# Patient Record
Sex: MALE | Race: WHITE | URBAN - METROPOLITAN AREA
[De-identification: names, ages, dates, MRNs, and addresses within clinical notes are randomized per-mention and may not be internally consistent; named-entity substitution may affect disease eponyms.]

---

## 2017-02-07 PROBLEM — Z00.00 ENCOUNTER FOR PREVENTIVE HEALTH EXAMINATION: Status: ACTIVE | Noted: 2017-02-07

## 2017-02-08 ENCOUNTER — OUTPATIENT (OUTPATIENT)
Dept: OUTPATIENT SERVICES | Facility: HOSPITAL | Age: 47
LOS: 1 days | End: 2017-02-08

## 2017-02-08 VITALS
DIASTOLIC BLOOD PRESSURE: 82 MMHG | SYSTOLIC BLOOD PRESSURE: 124 MMHG | HEART RATE: 80 BPM | RESPIRATION RATE: 16 BRPM | WEIGHT: 171.08 LBS | HEIGHT: 63 IN | TEMPERATURE: 99 F

## 2017-02-08 DIAGNOSIS — K43.9 VENTRAL HERNIA WITHOUT OBSTRUCTION OR GANGRENE: ICD-10-CM

## 2017-02-08 DIAGNOSIS — J34.2 DEVIATED NASAL SEPTUM: Chronic | ICD-10-CM

## 2017-02-08 LAB
HCT VFR BLD CALC: 46.3 % — SIGNIFICANT CHANGE UP (ref 39–50)
HGB BLD-MCNC: 15.5 G/DL — SIGNIFICANT CHANGE UP (ref 13–17)
MCHC RBC-ENTMCNC: 31.6 PG — SIGNIFICANT CHANGE UP (ref 27–34)
MCHC RBC-ENTMCNC: 33.5 % — SIGNIFICANT CHANGE UP (ref 32–36)
MCV RBC AUTO: 94.5 FL — SIGNIFICANT CHANGE UP (ref 80–100)
PLATELET # BLD AUTO: 283 K/UL — SIGNIFICANT CHANGE UP (ref 150–400)
PMV BLD: 11.5 FL — SIGNIFICANT CHANGE UP (ref 7–13)
RBC # BLD: 4.9 M/UL — SIGNIFICANT CHANGE UP (ref 4.2–5.8)
RBC # FLD: 13.4 % — SIGNIFICANT CHANGE UP (ref 10.3–14.5)
WBC # BLD: 9.06 K/UL — SIGNIFICANT CHANGE UP (ref 3.8–10.5)
WBC # FLD AUTO: 9.06 K/UL — SIGNIFICANT CHANGE UP (ref 3.8–10.5)

## 2017-02-08 NOTE — H&P PST ADULT - PROBLEM SELECTOR PLAN 1
scheduled ventral hernia repair on 2/16/17.   preop instructions, gi prophylaxis & surgical soap given  cbc on admit

## 2017-02-08 NOTE — H&P PST ADULT - NSANTHOSAYNRD_GEN_A_CORE
No. JODI screening performed.  STOP BANG Legend: 0-2 = LOW Risk; 3-4 = INTERMEDIATE Risk; 5-8 = HIGH Risk

## 2017-02-16 ENCOUNTER — OUTPATIENT (OUTPATIENT)
Dept: OUTPATIENT SERVICES | Facility: HOSPITAL | Age: 47
LOS: 1 days | Discharge: HOME | End: 2017-02-16

## 2017-02-16 ENCOUNTER — TRANSCRIPTION ENCOUNTER (OUTPATIENT)
Age: 47
End: 2017-02-16

## 2017-02-16 VITALS
OXYGEN SATURATION: 96 % | SYSTOLIC BLOOD PRESSURE: 110 MMHG | TEMPERATURE: 98 F | HEART RATE: 63 BPM | DIASTOLIC BLOOD PRESSURE: 77 MMHG

## 2017-02-16 VITALS
TEMPERATURE: 97 F | RESPIRATION RATE: 16 BRPM | WEIGHT: 171.08 LBS | DIASTOLIC BLOOD PRESSURE: 89 MMHG | HEIGHT: 63 IN | OXYGEN SATURATION: 97 % | HEART RATE: 90 BPM | SYSTOLIC BLOOD PRESSURE: 142 MMHG

## 2017-02-16 DIAGNOSIS — K43.9 VENTRAL HERNIA WITHOUT OBSTRUCTION OR GANGRENE: ICD-10-CM

## 2017-02-16 DIAGNOSIS — J34.2 DEVIATED NASAL SEPTUM: Chronic | ICD-10-CM

## 2017-02-16 RX ORDER — IBUPROFEN 200 MG
1 TABLET ORAL
Qty: 30 | Refills: 0
Start: 2017-02-16

## 2017-02-16 RX ORDER — OXYCODONE AND ACETAMINOPHEN 5; 325 MG/1; MG/1
1 TABLET ORAL
Qty: 10 | Refills: 0
Start: 2017-02-16

## 2017-02-16 NOTE — ASU DISCHARGE PLAN (ADULT/PEDIATRIC). - DIET
progress slowly,avoid any foods that are spicy, greasy or fatty, increase fluids and resume regular diet in am

## 2017-02-16 NOTE — ASU DISCHARGE PLAN (ADULT/PEDIATRIC). - INSTRUCTIONS
SEE Preprinted Instructions from MD Apply ice to surgical incision as need for comfort. 20min ON/20min OFF

## 2017-02-16 NOTE — ASU DISCHARGE PLAN (ADULT/PEDIATRIC). - NOTIFY
Persistent Nausea and Vomiting/Fever greater than 101/Bleeding that does not stop/Pain not relieved by Medications/Unable to Urinate/Inability to Tolerate Liquids or Foods

## 2018-04-30 NOTE — H&P PST ADULT - GASTROINTESTINAL DETAILS
Please inform patient that her alk phos is still slightly elevated but has decreased from last labs. There are some minor abnormalities of the rest of her labs that should just continue to be monitored by pcp. bowel sounds normal/no masses palpable/no distention/nontender/umbilical hernia/soft

## 2018-08-01 PROBLEM — J30.2 OTHER SEASONAL ALLERGIC RHINITIS: Chronic | Status: ACTIVE | Noted: 2017-02-08

## 2018-08-01 PROBLEM — K43.9 VENTRAL HERNIA WITHOUT OBSTRUCTION OR GANGRENE: Chronic | Status: ACTIVE | Noted: 2017-02-08

## 2018-09-10 ENCOUNTER — APPOINTMENT (OUTPATIENT)
Dept: OTOLARYNGOLOGY | Facility: CLINIC | Age: 48
End: 2018-09-10
Payer: COMMERCIAL

## 2018-09-10 VITALS
HEIGHT: 64 IN | HEART RATE: 94 BPM | WEIGHT: 165 LBS | BODY MASS INDEX: 28.17 KG/M2 | SYSTOLIC BLOOD PRESSURE: 120 MMHG | DIASTOLIC BLOOD PRESSURE: 79 MMHG

## 2018-09-10 DIAGNOSIS — H69.83 OTHER SPECIFIED DISORDERS OF EUSTACHIAN TUBE, BILATERAL: ICD-10-CM

## 2018-09-10 DIAGNOSIS — H61.23 IMPACTED CERUMEN, BILATERAL: ICD-10-CM

## 2018-09-10 PROCEDURE — 69210 REMOVE IMPACTED EAR WAX UNI: CPT

## 2018-09-10 PROCEDURE — 99204 OFFICE O/P NEW MOD 45 MIN: CPT | Mod: 25

## 2019-05-31 NOTE — H&P PST ADULT - CARDIOVASCULAR
Interval History: Improvement after HD treatment yesterday, UF 2.5 L. Patient now on 4 L via NC. Electrolytes and acid base stable. No complaints of N/V or abdominal pain.     Review of patient's allergies indicates:   Allergen Reactions    Bactrim [sulfamethoxazole-trimethoprim] Other (See Comments)     Causes renal failure    Nsaids (non-steroidal anti-inflammatory drug) Other (See Comments)     Renal failure     Current Facility-Administered Medications   Medication Frequency    acetaminophen tablet 650 mg Q4H PRN    albuterol-ipratropium 2.5 mg-0.5 mg/3 mL nebulizer solution 3 mL Q4H PRN    aspirin EC tablet 81 mg Daily    cinacalcet tablet 30 mg Daily with breakfast    clopidogrel tablet 75 mg Daily    dextrose 50% injection 12.5 g PRN    dextrose 50% injection 25 g PRN    fludrocortisone tablet 100 mcg BID    glucagon (human recombinant) injection 1 mg PRN    glucose chewable tablet 16 g PRN    glucose chewable tablet 24 g PRN    insulin aspart U-100 pen 0-5 Units QID (AC + HS) PRN    levETIRAcetam in NaCl (iso-os) IVPB 500 mg Q12H    methylPREDNISolone sodium succinate injection 40 mg BID    midodrine tablet 10 mg TID    pantoprazole EC tablet 40 mg Daily    piperacillin-tazobactam 4.5 g in sodium chloride 0.9% 100 mL IVPB (ready to mix system) Q12H    prednisoLONE acetate 1 % ophthalmic suspension 1 drop TID    sevelamer carbonate tablet 800 mg TID WM    sodium chloride 0.9% flush 10 mL PRN    vitamin renal formula (B-complex-vitamin c-folic acid) 1 mg per capsule 1 capsule Daily    warfarin (COUMADIN) tablet 1 mg Once       Objective:     Vital Signs (Most Recent):  Temp: 98 °F (36.7 °C) (05/31/19 0300)  Pulse: 69 (05/31/19 1400)  Resp: (!) 39 (05/31/19 1400)  BP: 96/71 (05/31/19 1400)  SpO2: 100 % (05/31/19 1400)  O2 Device (Oxygen Therapy): nasal cannula (05/31/19 1400) Vital Signs (24h Range):  Temp:  [98 °F (36.7 °C)-99.6 °F (37.6 °C)] 98 °F (36.7 °C)  Pulse:  [60-75] 69  Resp:   [19-57] 39  SpO2:  [85 %-100 %] 100 %  BP: ()/() 96/71     Weight: 108.2 kg (238 lb 8.6 oz) (05/31/19 0030)  Body mass index is 36.27 kg/m².  Body surface area is 2.28 meters squared.    I/O last 3 completed shifts:  In: 250 [Other:250]  Out: 2750 [Other:2750]    Physical Exam   Constitutional: She is oriented to person, place, and time. She appears well-developed. She is cooperative. She appears ill. No distress. Nasal cannula in place.   HENT:   Head: Atraumatic.   Right Ear: External ear normal.   Left Ear: External ear normal.   Susan-orbital edema   Eyes: Conjunctivae and EOM are normal. Right eye exhibits no discharge. Left eye exhibits no discharge.   Neck: Normal range of motion. Neck supple.   Cardiovascular: Regular rhythm. Exam reveals no gallop and no friction rub.   Murmur heard.  Pulmonary/Chest: No accessory muscle usage. No tachypnea. No respiratory distress. She has decreased breath sounds. She has rales.   Abdominal: Soft. Bowel sounds are normal. She exhibits no distension. There is no tenderness.   Musculoskeletal: She exhibits no edema or deformity.   Neurological: She is alert and oriented to person, place, and time.   Skin: Skin is warm and dry. She is not diaphoretic.   RAISSA AVG       Significant Labs:  CBC:   Recent Labs   Lab 05/31/19  0330   WBC 12.00   RBC 4.93   HGB 12.8   HCT 41.2   *   MCV 84   MCH 26.0*   MCHC 31.1*     CMP:   Recent Labs   Lab 05/31/19  0330   *   CALCIUM 7.8*   ALBUMIN 2.6*   PROT 7.6   *   K 4.5   CO2 26   CL 91*   BUN 19   CREATININE 4.3*   ALKPHOS 105   *   *   BILITOT 1.3*           details… detailed exam

## 2024-05-21 ENCOUNTER — INPATIENT (INPATIENT)
Facility: HOSPITAL | Age: 54
LOS: 0 days | Discharge: HOME | DRG: 190 | End: 2024-05-22
Attending: INTERNAL MEDICINE | Admitting: INTERNAL MEDICINE
Payer: COMMERCIAL

## 2024-05-21 ENCOUNTER — RESULT REVIEW (OUTPATIENT)
Age: 54
End: 2024-05-21

## 2024-05-21 VITALS
TEMPERATURE: 98 F | WEIGHT: 179.02 LBS | RESPIRATION RATE: 18 BRPM | HEART RATE: 83 BPM | SYSTOLIC BLOOD PRESSURE: 169 MMHG | HEIGHT: 70 IN | OXYGEN SATURATION: 96 % | DIASTOLIC BLOOD PRESSURE: 95 MMHG

## 2024-05-21 DIAGNOSIS — I21.3 ST ELEVATION (STEMI) MYOCARDIAL INFARCTION OF UNSPECIFIED SITE: ICD-10-CM

## 2024-05-21 DIAGNOSIS — J34.2 DEVIATED NASAL SEPTUM: Chronic | ICD-10-CM

## 2024-05-21 LAB
ABO RH CONFIRMATION: SIGNIFICANT CHANGE UP
ALBUMIN SERPL ELPH-MCNC: 4.1 G/DL — SIGNIFICANT CHANGE UP (ref 3.3–5)
ALP SERPL-CCNC: 57 U/L — SIGNIFICANT CHANGE UP (ref 40–120)
ALT FLD-CCNC: 96 U/L — HIGH (ref 12–78)
ANION GAP SERPL CALC-SCNC: 5 MMOL/L — SIGNIFICANT CHANGE UP (ref 5–17)
APTT BLD: 32.5 SEC — SIGNIFICANT CHANGE UP (ref 24.5–35.6)
AST SERPL-CCNC: 72 U/L — HIGH (ref 15–37)
BASOPHILS # BLD AUTO: 0.06 K/UL — SIGNIFICANT CHANGE UP (ref 0–0.2)
BASOPHILS NFR BLD AUTO: 0.4 % — SIGNIFICANT CHANGE UP (ref 0–2)
BILIRUB SERPL-MCNC: 0.3 MG/DL — SIGNIFICANT CHANGE UP (ref 0.2–1.2)
BLD GP AB SCN SERPL QL: SIGNIFICANT CHANGE UP
BLD GP AB SCN SERPL QL: SIGNIFICANT CHANGE UP
BUN SERPL-MCNC: 12 MG/DL — SIGNIFICANT CHANGE UP (ref 7–23)
CALCIUM SERPL-MCNC: 9.6 MG/DL — SIGNIFICANT CHANGE UP (ref 8.5–10.1)
CHLORIDE SERPL-SCNC: 106 MMOL/L — SIGNIFICANT CHANGE UP (ref 96–108)
CO2 SERPL-SCNC: 28 MMOL/L — SIGNIFICANT CHANGE UP (ref 22–31)
CREAT SERPL-MCNC: 0.81 MG/DL — SIGNIFICANT CHANGE UP (ref 0.5–1.3)
EGFR: 107 ML/MIN/1.73M2 — SIGNIFICANT CHANGE UP
EOSINOPHIL # BLD AUTO: 0.03 K/UL — SIGNIFICANT CHANGE UP (ref 0–0.5)
EOSINOPHIL NFR BLD AUTO: 0.2 % — SIGNIFICANT CHANGE UP (ref 0–6)
GLUCOSE SERPL-MCNC: 168 MG/DL — HIGH (ref 70–99)
HCT VFR BLD CALC: 47.9 % — SIGNIFICANT CHANGE UP (ref 39–50)
HGB BLD-MCNC: 16 G/DL — SIGNIFICANT CHANGE UP (ref 13–17)
IMM GRANULOCYTES NFR BLD AUTO: 0.5 % — SIGNIFICANT CHANGE UP (ref 0–0.9)
INR BLD: 0.94 RATIO — SIGNIFICANT CHANGE UP (ref 0.85–1.18)
LYMPHOCYTES # BLD AUTO: 1.32 K/UL — SIGNIFICANT CHANGE UP (ref 1–3.3)
LYMPHOCYTES # BLD AUTO: 8.1 % — LOW (ref 13–44)
MCHC RBC-ENTMCNC: 32.1 PG — SIGNIFICANT CHANGE UP (ref 27–34)
MCHC RBC-ENTMCNC: 33.4 GM/DL — SIGNIFICANT CHANGE UP (ref 32–36)
MCV RBC AUTO: 96 FL — SIGNIFICANT CHANGE UP (ref 80–100)
MONOCYTES # BLD AUTO: 0.72 K/UL — SIGNIFICANT CHANGE UP (ref 0–0.9)
MONOCYTES NFR BLD AUTO: 4.4 % — SIGNIFICANT CHANGE UP (ref 2–14)
NEUTROPHILS # BLD AUTO: 14.07 K/UL — HIGH (ref 1.8–7.4)
NEUTROPHILS NFR BLD AUTO: 86.4 % — HIGH (ref 43–77)
NT-PROBNP SERPL-SCNC: 25 PG/ML — SIGNIFICANT CHANGE UP (ref 0–125)
PLATELET # BLD AUTO: 305 K/UL — SIGNIFICANT CHANGE UP (ref 150–400)
POTASSIUM SERPL-MCNC: 3.8 MMOL/L — SIGNIFICANT CHANGE UP (ref 3.5–5.3)
POTASSIUM SERPL-SCNC: 3.8 MMOL/L — SIGNIFICANT CHANGE UP (ref 3.5–5.3)
PROT SERPL-MCNC: 7.4 GM/DL — SIGNIFICANT CHANGE UP (ref 6–8.3)
PROTHROM AB SERPL-ACNC: 10.6 SEC — SIGNIFICANT CHANGE UP (ref 9.5–13)
RBC # BLD: 4.99 M/UL — SIGNIFICANT CHANGE UP (ref 4.2–5.8)
RBC # FLD: 13.3 % — SIGNIFICANT CHANGE UP (ref 10.3–14.5)
SODIUM SERPL-SCNC: 139 MMOL/L — SIGNIFICANT CHANGE UP (ref 135–145)
TROPONIN I, HIGH SENSITIVITY RESULT: 1390.43 NG/L — HIGH
TROPONIN I, HIGH SENSITIVITY RESULT: HIGH NG/L
WBC # BLD: 16.28 K/UL — HIGH (ref 3.8–10.5)
WBC # FLD AUTO: 16.28 K/UL — HIGH (ref 3.8–10.5)

## 2024-05-21 PROCEDURE — 80061 LIPID PANEL: CPT

## 2024-05-21 PROCEDURE — 99285 EMERGENCY DEPT VISIT HI MDM: CPT

## 2024-05-21 PROCEDURE — 85027 COMPLETE CBC AUTOMATED: CPT

## 2024-05-21 PROCEDURE — 93010 ELECTROCARDIOGRAM REPORT: CPT | Mod: 76

## 2024-05-21 PROCEDURE — 93458 L HRT ARTERY/VENTRICLE ANGIO: CPT | Mod: 59

## 2024-05-21 PROCEDURE — 84484 ASSAY OF TROPONIN QUANT: CPT

## 2024-05-21 PROCEDURE — 86850 RBC ANTIBODY SCREEN: CPT

## 2024-05-21 PROCEDURE — C9606: CPT | Mod: RC

## 2024-05-21 PROCEDURE — 80048 BASIC METABOLIC PNL TOTAL CA: CPT

## 2024-05-21 PROCEDURE — 93005 ELECTROCARDIOGRAM TRACING: CPT

## 2024-05-21 PROCEDURE — 83036 HEMOGLOBIN GLYCOSYLATED A1C: CPT

## 2024-05-21 PROCEDURE — 86901 BLOOD TYPING SEROLOGIC RH(D): CPT

## 2024-05-21 PROCEDURE — C1874: CPT

## 2024-05-21 PROCEDURE — 84100 ASSAY OF PHOSPHORUS: CPT

## 2024-05-21 PROCEDURE — 84443 ASSAY THYROID STIM HORMONE: CPT

## 2024-05-21 PROCEDURE — 80076 HEPATIC FUNCTION PANEL: CPT

## 2024-05-21 PROCEDURE — 86900 BLOOD TYPING SEROLOGIC ABO: CPT

## 2024-05-21 PROCEDURE — C1887: CPT

## 2024-05-21 PROCEDURE — 93306 TTE W/DOPPLER COMPLETE: CPT | Mod: 26

## 2024-05-21 PROCEDURE — 36415 COLL VENOUS BLD VENIPUNCTURE: CPT

## 2024-05-21 PROCEDURE — 83735 ASSAY OF MAGNESIUM: CPT

## 2024-05-21 PROCEDURE — C1725: CPT

## 2024-05-21 PROCEDURE — C1769: CPT

## 2024-05-21 RX ORDER — CLOPIDOGREL BISULFATE 75 MG/1
75 TABLET, FILM COATED ORAL DAILY
Refills: 0 | Status: DISCONTINUED | OUTPATIENT
Start: 2024-05-22 | End: 2024-05-22

## 2024-05-21 RX ORDER — SODIUM CHLORIDE 9 MG/ML
250 INJECTION INTRAMUSCULAR; INTRAVENOUS; SUBCUTANEOUS ONCE
Refills: 0 | Status: DISCONTINUED | OUTPATIENT
Start: 2024-05-21 | End: 2024-05-22

## 2024-05-21 RX ORDER — SODIUM CHLORIDE 9 MG/ML
1000 INJECTION INTRAMUSCULAR; INTRAVENOUS; SUBCUTANEOUS
Refills: 0 | Status: DISCONTINUED | OUTPATIENT
Start: 2024-05-21 | End: 2024-05-22

## 2024-05-21 RX ORDER — ATORVASTATIN CALCIUM 80 MG/1
40 TABLET, FILM COATED ORAL AT BEDTIME
Refills: 0 | Status: DISCONTINUED | OUTPATIENT
Start: 2024-05-21 | End: 2024-05-22

## 2024-05-21 RX ORDER — ASPIRIN/CALCIUM CARB/MAGNESIUM 324 MG
81 TABLET ORAL DAILY
Refills: 0 | Status: DISCONTINUED | OUTPATIENT
Start: 2024-05-21 | End: 2024-05-22

## 2024-05-21 RX ORDER — CLOPIDOGREL BISULFATE 75 MG/1
600 TABLET, FILM COATED ORAL ONCE
Refills: 0 | Status: COMPLETED | OUTPATIENT
Start: 2024-05-21 | End: 2024-05-21

## 2024-05-21 RX ADMIN — ATORVASTATIN CALCIUM 40 MILLIGRAM(S): 80 TABLET, FILM COATED ORAL at 21:52

## 2024-05-21 RX ADMIN — SODIUM CHLORIDE 125 MILLILITER(S): 9 INJECTION INTRAMUSCULAR; INTRAVENOUS; SUBCUTANEOUS at 12:54

## 2024-05-21 RX ADMIN — CLOPIDOGREL BISULFATE 600 MILLIGRAM(S): 75 TABLET, FILM COATED ORAL at 10:40
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
## 2024-05-21 NOTE — H&P ADULT - NSHPREVIEWOFSYSTEMS_GEN_ALL_CORE
CONSTITUTIONAL: No fever, weight loss, or fatigue  EYES: No eye pain, visual disturbances, or discharge  ENMT:  No difficulty hearing, tinnitus, vertigo; No sinus or throat pain  NECK: No pain or stiffness  BREASTS: No pain, masses, or nipple discharge  RESPIRATORY: No cough, wheezing, chills or hemoptysis; No shortness of breath  CARDIOVASCULAR: + chest pain tightness, burning   GASTROINTESTINAL: No abdominal or epigastric pain. No nausea, vomiting, or hematemesis; No diarrhea or constipation. No melena or hematochezia.  GENITOURINARY: No dysuria, frequency, hematuria, or incontinence  NEUROLOGICAL: No headaches, memory loss, loss of strength, numbness, or tremors  SKIN: No itching, burning, rashes, or lesions   ENDOCRINE: No heat or cold intolerance; No hair loss  MUSCULOSKELETAL: No joint pain or swelling; No muscle, back, or extremity pain  PSYCHIATRIC: No depression, anxiety, mood swings, or difficulty sleeping

## 2024-05-21 NOTE — PATIENT PROFILE ADULT - FALL HARM RISK - HARM RISK INTERVENTIONS

## 2024-05-21 NOTE — ED ADULT NURSE NOTE - NSFALLUNIVINTERV_ED_ALL_ED
Bed/Stretcher in lowest position, wheels locked, appropriate side rails in place/Call bell, personal items and telephone in reach/Instruct patient to call for assistance before getting out of bed/chair/stretcher/Non-slip footwear applied when patient is off stretcher/[place] to call system/Physically safe environment - no spills, clutter or unnecessary equipment/Purposeful proactive rounding/Room/bathroom lighting operational, light cord in reach
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
## 2024-05-21 NOTE — ED PROVIDER NOTE - CONSTITUTIONAL, MLM
Mildly anxious appearing, awake, alert, oriented to person, place, time/situation. normal... Mildly anxious appearing, awake, alert, oriented to person, place, time/situation.  No respiratory distress

## 2024-05-21 NOTE — ED PROVIDER NOTE - NEUROLOGICAL, MLM
Alert and oriented, no focal deficits, no motor or sensory deficits. Alert and oriented, no focal deficits, no motor or sensory deficits.  CNs 2 - 12 intact, normal speech
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
## 2024-05-21 NOTE — ED PROVIDER NOTE - SKIN, MLM
Skin normal color for race, warm, dry and intact. No evidence of rash. Skin normal color for race, warm, dry and intact. No evidence of rash.  Mild diaphoresis.
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
## 2024-05-21 NOTE — ED PROVIDER NOTE - WR ORDER ID 1
Name of medication:  Atorvastaton, Metformin, Lisinopril, HCTZ  Last appt: October 2018  Scheduled in future:  March 2019  Pharmacy:  [facility] mail order
[id number]

## 2024-05-21 NOTE — ED PROVIDER NOTE - MUSCULOSKELETAL, MLM
Spine appears normal, range of motion is not limited, no muscle or joint tenderness Spine appears normal, range of motion is not limited, no muscle or joint tenderness.  GUAMAN x 4, no focal extremity swelling nor tenderness.

## 2024-05-21 NOTE — H&P ADULT - NSHPPHYSICALEXAM_GEN_ALL_CORE
- takes lisinopril/HCTZ at home   - current SBP in the 130s and 140s  - started lisinopril but holding HCTZ GENERAL: NAD, well-groomed, well-developed  HEAD:  Atraumatic, Normocephalic  EYES: EOMI, PERRLA, conjunctiva and sclera clear  ENMT: No tonsillar erythema, exudates, or enlargement; Moist mucous membranes, Good dentition, No lesions  NECK: Supple, No JVD, Normal thyroid  NERVOUS SYSTEM:  Alert & Oriented X3, Good concentration; Motor Strength 5/5 B/L upper and lower extremities; DTRs 2+ intact and symmetric  CHEST/LUNG: Clear to auscultation bilaterally; No rales, rhonchi, wheezing, or rubs  HEART: Regular rate and rhythm; No murmurs, rubs, or gallops  ABDOMEN: Soft, Nontender, Nondistended; Bowel sounds present  EXTREMITIES:  2+ Peripheral Pulses, No clubbing, cyanosis, or edema  LYMPH: No lymphadenopathy noted  SKIN: No rashes or lesions - takes lisinopril/HCTZ at home   - current SBP in the 110s and 130s  - started lisinopril but holding HCTZ

## 2024-05-22 ENCOUNTER — TRANSCRIPTION ENCOUNTER (OUTPATIENT)
Age: 54
End: 2024-05-22

## 2024-05-22 VITALS — TEMPERATURE: 99 F

## 2024-05-22 LAB
A1C WITH ESTIMATED AVERAGE GLUCOSE RESULT: 6.2 % — HIGH (ref 4–5.6)
ALBUMIN SERPL ELPH-MCNC: 3.6 G/DL — SIGNIFICANT CHANGE UP (ref 3.3–5)
ALP SERPL-CCNC: 52 U/L — SIGNIFICANT CHANGE UP (ref 40–120)
ALT FLD-CCNC: 96 U/L — HIGH (ref 12–78)
ANION GAP SERPL CALC-SCNC: 5 MMOL/L — SIGNIFICANT CHANGE UP (ref 5–17)
AST SERPL-CCNC: 211 U/L — HIGH (ref 15–37)
BILIRUB DIRECT SERPL-MCNC: 0.2 MG/DL — SIGNIFICANT CHANGE UP (ref 0–0.3)
BILIRUB INDIRECT FLD-MCNC: 0.7 MG/DL — SIGNIFICANT CHANGE UP (ref 0.2–1)
BILIRUB SERPL-MCNC: 0.9 MG/DL — SIGNIFICANT CHANGE UP (ref 0.2–1.2)
BUN SERPL-MCNC: 9 MG/DL — SIGNIFICANT CHANGE UP (ref 7–23)
CALCIUM SERPL-MCNC: 9.2 MG/DL — SIGNIFICANT CHANGE UP (ref 8.5–10.1)
CHLORIDE SERPL-SCNC: 108 MMOL/L — SIGNIFICANT CHANGE UP (ref 96–108)
CHOLEST SERPL-MCNC: 184 MG/DL — SIGNIFICANT CHANGE UP
CO2 SERPL-SCNC: 26 MMOL/L — SIGNIFICANT CHANGE UP (ref 22–31)
CREAT SERPL-MCNC: 0.76 MG/DL — SIGNIFICANT CHANGE UP (ref 0.5–1.3)
EGFR: 109 ML/MIN/1.73M2 — SIGNIFICANT CHANGE UP
ESTIMATED AVERAGE GLUCOSE: 131 MG/DL — HIGH (ref 68–114)
GLUCOSE SERPL-MCNC: 116 MG/DL — HIGH (ref 70–99)
HCT VFR BLD CALC: 46.6 % — SIGNIFICANT CHANGE UP (ref 39–50)
HDLC SERPL-MCNC: 46 MG/DL — SIGNIFICANT CHANGE UP
HGB BLD-MCNC: 15.7 G/DL — SIGNIFICANT CHANGE UP (ref 13–17)
LIPID PNL WITH DIRECT LDL SERPL: 118 MG/DL — HIGH
MAGNESIUM SERPL-MCNC: 2.2 MG/DL — SIGNIFICANT CHANGE UP (ref 1.6–2.6)
MCHC RBC-ENTMCNC: 31.8 PG — SIGNIFICANT CHANGE UP (ref 27–34)
MCHC RBC-ENTMCNC: 33.7 GM/DL — SIGNIFICANT CHANGE UP (ref 32–36)
MCV RBC AUTO: 94.3 FL — SIGNIFICANT CHANGE UP (ref 80–100)
NON HDL CHOLESTEROL: 138 MG/DL — HIGH
PHOSPHATE SERPL-MCNC: 2.7 MG/DL — SIGNIFICANT CHANGE UP (ref 2.5–4.5)
PLATELET # BLD AUTO: 279 K/UL — SIGNIFICANT CHANGE UP (ref 150–400)
POTASSIUM SERPL-MCNC: 3.6 MMOL/L — SIGNIFICANT CHANGE UP (ref 3.5–5.3)
POTASSIUM SERPL-SCNC: 3.6 MMOL/L — SIGNIFICANT CHANGE UP (ref 3.5–5.3)
PROT SERPL-MCNC: 6.9 GM/DL — SIGNIFICANT CHANGE UP (ref 6–8.3)
RBC # BLD: 4.94 M/UL — SIGNIFICANT CHANGE UP (ref 4.2–5.8)
RBC # FLD: 13.4 % — SIGNIFICANT CHANGE UP (ref 10.3–14.5)
SODIUM SERPL-SCNC: 139 MMOL/L — SIGNIFICANT CHANGE UP (ref 135–145)
TRIGL SERPL-MCNC: 114 MG/DL — SIGNIFICANT CHANGE UP
TROPONIN I, HIGH SENSITIVITY RESULT: HIGH NG/L
TSH SERPL-MCNC: 0.8 UU/ML — SIGNIFICANT CHANGE UP (ref 0.34–4.82)
WBC # BLD: 13.84 K/UL — HIGH (ref 3.8–10.5)
WBC # FLD AUTO: 13.84 K/UL — HIGH (ref 3.8–10.5)

## 2024-05-22 PROCEDURE — 93010 ELECTROCARDIOGRAM REPORT: CPT

## 2024-05-22 PROCEDURE — 99239 HOSP IP/OBS DSCHRG MGMT >30: CPT

## 2024-05-22 RX ORDER — METOPROLOL TARTRATE 50 MG
1 TABLET ORAL
Qty: 30 | Refills: 5
Start: 2024-05-22 | End: 2024-11-17

## 2024-05-22 RX ORDER — ATORVASTATIN CALCIUM 80 MG/1
1 TABLET, FILM COATED ORAL
Qty: 30 | Refills: 10
Start: 2024-05-22 | End: 2025-04-16

## 2024-05-22 RX ORDER — ASPIRIN/CALCIUM CARB/MAGNESIUM 324 MG
1 TABLET ORAL
Qty: 30 | Refills: 10 | DISCHARGE
Start: 2024-05-22 | End: 2025-04-16

## 2024-05-22 RX ORDER — METOPROLOL TARTRATE 50 MG
25 TABLET ORAL DAILY
Refills: 0 | Status: DISCONTINUED | OUTPATIENT
Start: 2024-05-22 | End: 2024-05-22

## 2024-05-22 RX ORDER — ASPIRIN/CALCIUM CARB/MAGNESIUM 324 MG
1 TABLET ORAL
Qty: 30 | Refills: 10
Start: 2024-05-22 | End: 2025-04-16

## 2024-05-22 RX ORDER — CLOPIDOGREL BISULFATE 75 MG/1
1 TABLET, FILM COATED ORAL
Qty: 30 | Refills: 10
Start: 2024-05-22 | End: 2025-04-16

## 2024-05-22 RX ADMIN — Medication 25 MILLIGRAM(S): at 09:14

## 2024-05-22 RX ADMIN — Medication 81 MILLIGRAM(S): at 09:14

## 2024-05-22 RX ADMIN — CLOPIDOGREL BISULFATE 75 MILLIGRAM(S): 75 TABLET, FILM COATED ORAL at 09:14

## 2024-05-22 NOTE — CHART NOTE - NSCHARTNOTEFT_GEN_A_CORE
Cardiac Rehab Referral     ANTHONY CAMPOS 53yM  MRN: [id number]  : 74  Admit: 24  Patient is a 53y old  Male who presents with a chief complaint of Chest Pain/ STEMI (22 May 2024 09:34)      Pt.  s/p LHC with ADOLFO to to LCX, LAD, RCA     Qualified for cardiac rehab       Patient refused referral information sheet with [facility] website provided

## 2024-05-22 NOTE — DISCHARGE NOTE PROVIDER - NSDCCPCAREPLAN_GEN_ALL_CORE_FT
PRINCIPAL DISCHARGE DIAGNOSIS  Diagnosis: Acute ST elevation myocardial infarction (STEMI)  Assessment and Plan of Treatment:

## 2024-05-22 NOTE — CONSULT NOTE ADULT - NS_MD_PANP_GEN_ALL_CORE
Take Tylenol and Motrin as indicated  Follow-up with Orthopedics as needed  Follow-up with PCP  Follow up with emergency department symptoms persist or exacerbate
Attending and PA/NP shared services statement (NON-critical care):

## 2024-05-22 NOTE — DISCHARGE NOTE NURSING/CASE MANAGEMENT/SOCIAL WORK - PATIENT PORTAL LINK FT
You can access the FollowMyHealth Patient Portal offered by [facility] by registering at the following website: [email or web address]. By joining [facility]’s FollowMyHealth portal, you will also be able to view your health information using other applications (apps) compatible with our system.
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
## 2024-05-22 NOTE — DISCHARGE NOTE PROVIDER - NSDCCPGOAL_GEN_ALL_CORE_FT
To get better and follow your care plan as instructed. To get better and follow your care plan as instructed.  Pt. will remain free from chest pain

## 2024-05-22 NOTE — DISCHARGE NOTE PROVIDER - NSDCMRMEDTOKEN_GEN_ALL_CORE_FT
aspirin 81 mg oral tablet, chewable: 1 tab(s) orally once a day  atorvastatin 40 mg oral tablet: 1 tab(s) orally once a day (at bedtime)  clopidogrel 75 mg oral tablet: 1 tab(s) orally once a day  metoprolol succinate 25 mg oral tablet, extended release: 1 tab(s) orally once a day

## 2024-05-22 NOTE — PROGRESS NOTE ADULT - PROVIDER SPECIALTY LIST ADULT
PROCEDURE:  Colonoscopy to the terminal ileum with 1 cold snare polypectomy and biopsies.    DATE OF PROCEDURE:  June 2, 2020    REFERRING PROVIDER:  Amanda Pruett APRN     INSTRUMENT USED:  Olympus PCF H 190 videocolonoscope.      INDICATIONS OF THE PROCEDURE: This is a 27-year-old white female with history of recurrent diarrhea and bright red blood per rectum.    PREVIOUS COLONOSCOPY: None.    BIOPSIES: Random biopsies were obtained from the colon including rectum.  Biopsies were obtained from the terminal ileum.  A cold snare polypectomy was performed in the ascending colon.      PHOTOGRAPHS:  Photographs were included in the medical records.     MEDICATIONS:  MAC.       CONSENT/PREPROCEDURE EVALUATION:  Risks, benefits, alternatives and options of the procedure including risks of sedation/anesthesia were discussed with the patient and informed consent was obtained prior to the procedure.  History and physical examination were performed and nothing precluded the test.      REPORT:  The patient was placed in left lateral decubitus position and a digital examination was performed.  Once under the influence of IV sedation, the instrument was inserted into the rectum and advanced under direct vision to cecum which was identified by the ileocecal valve, triradiate folds and appendiceal orifice. The scope was then maneuvered into the terminal ileum.        FINDINGS:      Digital rectal examination:  Good anal tone.  No perianal pathology.  No mass.        Terminal ileum:  5-6 cm.  Normal.     Cecum and ascending colon:   A 4 mm sessile polyp in the ascending colon was removed with cold snare.     Hepatic flexure, transverse colon, splenic flexure:  Normal.         Descending colon, sigmoid colon and rectum: Possible scant early diverticular change in the left colon.  No endoscopic evidence of colitis was seen.  Random biopsies were obtained from the colon upon withdrawal of the scope.  A retroflex examination  within the rectum revealed internal hemorrhoids.        The scope was then straightened, the lower GI tract was decompressed, and the scope was pulled out of the patient.  The patient tolerated the procedure well.  There were no immediate complications and the patient was transferred in stable condition for post procedure observation.      TECHNICAL DATA:   1. [place] prep score: 9 (3+3+3).    2. Anus to cecal time: 2  minutes.  3. Difficulty of examination:  Average.    4. Withdrawal time: 18 minutes.  5. Procedure time:  22 minutes  6. Retroflex examination in right colon: Yes.    7. Second look Rectum to cecum with decompression: Yes.    DIAGNOSES:  1. Scant early diverticular change in the left colon.  2. Colon polyp.  One was found and removed.  3. Internal hemorrhoids.  4. No endoscopic evidence of colitis was seen.  Random biopsies were obtained from the colon upon withdrawal of the scope.    RECOMMENDATIONS:     1. Dietary instructions.  2. Follow biopsies.    3. Follow-up: 3 to 4 weeks.  4. Followup colonoscopy: Possibly in 5 years depending on the biopsy results.          Thank you very much for letting me participate in the care of this patient. Please do not hesitate to call me if you have any questions.       [facility]
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
## 2024-05-22 NOTE — DISCHARGE NOTE PROVIDER - NSDCFUADDAPPT_GEN_ALL_CORE_FT
CARDIOLOGY FOLLOW UP APPOINTMENT: 6/5/24 @1:30PM at [facility] with SALVATORE Rdz
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
## 2024-05-22 NOTE — DISCHARGE NOTE PROVIDER - CARE PROVIDER_API CALL
Rick Dueñas  [facility]  [address] [place] [address]  Phone: [phone]  Fax: [phone]  Follow Up Time:

## 2024-05-22 NOTE — DISCHARGE NOTE NURSING/CASE MANAGEMENT/SOCIAL WORK - NSDCFUADDAPPT_GEN_ALL_CORE_FT
CARDIOLOGY FOLLOW UP APPOINTMENT: 6/5/24 @1:30PM at [facility] with SALVATORE Rdz

## 2024-05-22 NOTE — DISCHARGE NOTE PROVIDER - ATTENDING DISCHARGE PHYSICAL EXAMINATION:
Patient chest pain free stable for discharge  f/u at [facility]  on aspirin , plavix statin, metoprolol
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
## 2024-05-24 NOTE — POST DISCHARGE NOTE - DETAILS:
Post procedure phone call completed; patient understood all discharge paperwork. No questions regarding medications or pain management. MD follow up appointment made. Patient was able to rest when they were discharged. Patient will recommend [facility], no complaints of hospital stay, satisfied with care. Instructed patient to contact provider with any further questions or concerns.

## 2024-05-28 DIAGNOSIS — K43.9 VENTRAL HERNIA WITHOUT OBSTRUCTION OR GANGRENE: ICD-10-CM

## 2024-05-28 DIAGNOSIS — I21.19 ST ELEVATION (STEMI) MYOCARDIAL INFARCTION INVOLVING OTHER CORONARY ARTERY OF INFERIOR WALL: ICD-10-CM

## 2024-05-28 DIAGNOSIS — I25.10 ATHEROSCLEROTIC HEART DISEASE OF NATIVE CORONARY ARTERY WITHOUT ANGINA PECTORIS: ICD-10-CM

## 2024-05-28 DIAGNOSIS — J30.2 OTHER SEASONAL ALLERGIC RHINITIS: ICD-10-CM

## 2024-06-05 ENCOUNTER — RESULT REVIEW (OUTPATIENT)
Age: 54
End: 2024-06-05

## 2024-06-05 ENCOUNTER — INPATIENT (INPATIENT)
Facility: HOSPITAL | Age: 54
LOS: 5 days | Discharge: HOME | DRG: 161 | End: 2024-06-11
Attending: INTERNAL MEDICINE | Admitting: SURGERY
Payer: COMMERCIAL

## 2024-06-05 VITALS
WEIGHT: 147.27 LBS | SYSTOLIC BLOOD PRESSURE: 130 MMHG | HEIGHT: 70 IN | TEMPERATURE: 98 F | HEART RATE: 168 BPM | OXYGEN SATURATION: 97 % | RESPIRATION RATE: 19 BRPM | DIASTOLIC BLOOD PRESSURE: 94 MMHG

## 2024-06-05 DIAGNOSIS — I47.20 VENTRICULAR TACHYCARDIA, UNSPECIFIED: ICD-10-CM

## 2024-06-05 DIAGNOSIS — J34.2 DEVIATED NASAL SEPTUM: Chronic | ICD-10-CM

## 2024-06-05 LAB
ADD ON TEST-SPECIMEN IN LAB: SIGNIFICANT CHANGE UP
ALBUMIN SERPL ELPH-MCNC: 3.7 G/DL — SIGNIFICANT CHANGE UP (ref 3.3–5)
ALP SERPL-CCNC: 64 U/L — SIGNIFICANT CHANGE UP (ref 40–120)
ALT FLD-CCNC: 131 U/L — HIGH (ref 12–78)
ANION GAP SERPL CALC-SCNC: 7 MMOL/L — SIGNIFICANT CHANGE UP (ref 5–17)
APPEARANCE UR: CLEAR — SIGNIFICANT CHANGE UP
APTT BLD: 31.3 SEC — SIGNIFICANT CHANGE UP (ref 24.5–35.6)
AST SERPL-CCNC: 52 U/L — HIGH (ref 15–37)
BASOPHILS # BLD AUTO: 0.04 K/UL — SIGNIFICANT CHANGE UP (ref 0–0.2)
BASOPHILS NFR BLD AUTO: 0.2 % — SIGNIFICANT CHANGE UP (ref 0–2)
BILIRUB SERPL-MCNC: 0.9 MG/DL — SIGNIFICANT CHANGE UP (ref 0.2–1.2)
BILIRUB UR-MCNC: NEGATIVE — SIGNIFICANT CHANGE UP
BLD GP AB SCN SERPL QL: SIGNIFICANT CHANGE UP
BUN SERPL-MCNC: 20 MG/DL — SIGNIFICANT CHANGE UP (ref 7–23)
CALCIUM SERPL-MCNC: 9.3 MG/DL — SIGNIFICANT CHANGE UP (ref 8.5–10.1)
CHLORIDE SERPL-SCNC: 103 MMOL/L — SIGNIFICANT CHANGE UP (ref 96–108)
CK SERPL-CCNC: 183 U/L — SIGNIFICANT CHANGE UP (ref 26–308)
CO2 SERPL-SCNC: 25 MMOL/L — SIGNIFICANT CHANGE UP (ref 22–31)
COLOR SPEC: YELLOW — SIGNIFICANT CHANGE UP
CREAT SERPL-MCNC: 1.17 MG/DL — SIGNIFICANT CHANGE UP (ref 0.5–1.3)
DIFF PNL FLD: NEGATIVE — SIGNIFICANT CHANGE UP
EGFR: 75 ML/MIN/1.73M2 — SIGNIFICANT CHANGE UP
EOSINOPHIL # BLD AUTO: 0 K/UL — SIGNIFICANT CHANGE UP (ref 0–0.5)
EOSINOPHIL NFR BLD AUTO: 0 % — SIGNIFICANT CHANGE UP (ref 0–6)
GLUCOSE BLDC GLUCOMTR-MCNC: 78 MG/DL — SIGNIFICANT CHANGE UP (ref 70–99)
GLUCOSE BLDC GLUCOMTR-MCNC: 92 MG/DL — SIGNIFICANT CHANGE UP (ref 70–99)
GLUCOSE SERPL-MCNC: 220 MG/DL — HIGH (ref 70–99)
GLUCOSE UR QL: NEGATIVE MG/DL — SIGNIFICANT CHANGE UP
HCT VFR BLD CALC: 44 % — SIGNIFICANT CHANGE UP (ref 39–50)
HGB BLD-MCNC: 14.8 G/DL — SIGNIFICANT CHANGE UP (ref 13–17)
IMM GRANULOCYTES NFR BLD AUTO: 0.5 % — SIGNIFICANT CHANGE UP (ref 0–0.9)
INR BLD: 1.1 RATIO — SIGNIFICANT CHANGE UP (ref 0.85–1.18)
KETONES UR-MCNC: NEGATIVE MG/DL — SIGNIFICANT CHANGE UP
LEUKOCYTE ESTERASE UR-ACNC: NEGATIVE — SIGNIFICANT CHANGE UP
LYMPHOCYTES # BLD AUTO: 1.63 K/UL — SIGNIFICANT CHANGE UP (ref 1–3.3)
LYMPHOCYTES # BLD AUTO: 9.4 % — LOW (ref 13–44)
MAGNESIUM SERPL-MCNC: 2.2 MG/DL — SIGNIFICANT CHANGE UP (ref 1.6–2.6)
MCHC RBC-ENTMCNC: 32.5 PG — SIGNIFICANT CHANGE UP (ref 27–34)
MCHC RBC-ENTMCNC: 33.6 GM/DL — SIGNIFICANT CHANGE UP (ref 32–36)
MCV RBC AUTO: 96.5 FL — SIGNIFICANT CHANGE UP (ref 80–100)
MONOCYTES # BLD AUTO: 1.24 K/UL — HIGH (ref 0–0.9)
MONOCYTES NFR BLD AUTO: 7.2 % — SIGNIFICANT CHANGE UP (ref 2–14)
NEUTROPHILS # BLD AUTO: 14.35 K/UL — HIGH (ref 1.8–7.4)
NEUTROPHILS NFR BLD AUTO: 82.7 % — HIGH (ref 43–77)
NITRITE UR-MCNC: NEGATIVE — SIGNIFICANT CHANGE UP
NT-PROBNP SERPL-SCNC: 4828 PG/ML — HIGH (ref 0–125)
PH UR: 7 — SIGNIFICANT CHANGE UP (ref 5–8)
PHOSPHATE SERPL-MCNC: 3.3 MG/DL — SIGNIFICANT CHANGE UP (ref 2.5–4.5)
PLATELET # BLD AUTO: 334 K/UL — SIGNIFICANT CHANGE UP (ref 150–400)
POTASSIUM SERPL-MCNC: 3.9 MMOL/L — SIGNIFICANT CHANGE UP (ref 3.5–5.3)
POTASSIUM SERPL-SCNC: 3.9 MMOL/L — SIGNIFICANT CHANGE UP (ref 3.5–5.3)
PROT SERPL-MCNC: 7.1 GM/DL — SIGNIFICANT CHANGE UP (ref 6–8.3)
PROT UR-MCNC: NEGATIVE MG/DL — SIGNIFICANT CHANGE UP
PROTHROM AB SERPL-ACNC: 12.4 SEC — SIGNIFICANT CHANGE UP (ref 9.5–13)
RBC # BLD: 4.56 M/UL — SIGNIFICANT CHANGE UP (ref 4.2–5.8)
RBC # FLD: 13.1 % — SIGNIFICANT CHANGE UP (ref 10.3–14.5)
SODIUM SERPL-SCNC: 135 MMOL/L — SIGNIFICANT CHANGE UP (ref 135–145)
SP GR SPEC: 1.01 — SIGNIFICANT CHANGE UP (ref 1–1.03)
TROPONIN I, HIGH SENSITIVITY RESULT: 117.02 NG/L — HIGH
TROPONIN I, HIGH SENSITIVITY RESULT: 76.3 NG/L — SIGNIFICANT CHANGE UP
UROBILINOGEN FLD QL: 1 MG/DL — SIGNIFICANT CHANGE UP (ref 0.2–1)
WBC # BLD: 17.34 K/UL — HIGH (ref 3.8–10.5)
WBC # FLD AUTO: 17.34 K/UL — HIGH (ref 3.8–10.5)

## 2024-06-05 PROCEDURE — 85025 COMPLETE CBC W/AUTO DIFF WBC: CPT

## 2024-06-05 PROCEDURE — 81003 URINALYSIS AUTO W/O SCOPE: CPT

## 2024-06-05 PROCEDURE — 80048 BASIC METABOLIC PNL TOTAL CA: CPT

## 2024-06-05 PROCEDURE — 86900 BLOOD TYPING SEROLOGIC ABO: CPT

## 2024-06-05 PROCEDURE — 99285 EMERGENCY DEPT VISIT HI MDM: CPT

## 2024-06-05 PROCEDURE — C1894: CPT

## 2024-06-05 PROCEDURE — 36415 COLL VENOUS BLD VENIPUNCTURE: CPT

## 2024-06-05 PROCEDURE — C1769: CPT

## 2024-06-05 PROCEDURE — C1777: CPT

## 2024-06-05 PROCEDURE — C1887: CPT

## 2024-06-05 PROCEDURE — 86850 RBC ANTIBODY SCREEN: CPT

## 2024-06-05 PROCEDURE — 93010 ELECTROCARDIOGRAM REPORT: CPT | Mod: 76

## 2024-06-05 PROCEDURE — 93458 L HRT ARTERY/VENTRICLE ANGIO: CPT

## 2024-06-05 PROCEDURE — 86901 BLOOD TYPING SEROLOGIC RH(D): CPT

## 2024-06-05 PROCEDURE — 80076 HEPATIC FUNCTION PANEL: CPT

## 2024-06-05 PROCEDURE — 85027 COMPLETE CBC AUTOMATED: CPT

## 2024-06-05 PROCEDURE — 83735 ASSAY OF MAGNESIUM: CPT

## 2024-06-05 PROCEDURE — 92960 CARDIOVERSION ELECTRIC EXT: CPT

## 2024-06-05 PROCEDURE — 84484 ASSAY OF TROPONIN QUANT: CPT

## 2024-06-05 PROCEDURE — 93005 ELECTROCARDIOGRAM TRACING: CPT

## 2024-06-05 PROCEDURE — 80053 COMPREHEN METABOLIC PANEL: CPT

## 2024-06-05 PROCEDURE — 93308 TTE F-UP OR LMTD: CPT | Mod: 26

## 2024-06-05 PROCEDURE — 71045 X-RAY EXAM CHEST 1 VIEW: CPT

## 2024-06-05 PROCEDURE — 82550 ASSAY OF CK (CPK): CPT

## 2024-06-05 PROCEDURE — C1898: CPT

## 2024-06-05 PROCEDURE — 84100 ASSAY OF PHOSPHORUS: CPT

## 2024-06-05 PROCEDURE — 33249 INSJ/RPLCMT DEFIB W/LEAD(S): CPT

## 2024-06-05 PROCEDURE — 71045 X-RAY EXAM CHEST 1 VIEW: CPT | Mod: 26

## 2024-06-05 PROCEDURE — 82962 GLUCOSE BLOOD TEST: CPT

## 2024-06-05 PROCEDURE — C1721: CPT

## 2024-06-05 RX ORDER — PANTOPRAZOLE SODIUM 20 MG/1
40 TABLET, DELAYED RELEASE ORAL
Refills: 0 | Status: DISCONTINUED | OUTPATIENT
Start: 2024-06-05 | End: 2024-06-07

## 2024-06-05 RX ORDER — DEXTROSE 10 % IN WATER 10 %
125 INTRAVENOUS SOLUTION INTRAVENOUS ONCE
Refills: 0 | Status: DISCONTINUED | OUTPATIENT
Start: 2024-06-05 | End: 2024-06-07

## 2024-06-05 RX ORDER — ADENOSINE 3 MG/ML
6 INJECTION INTRAVENOUS ONCE
Refills: 0 | Status: COMPLETED | OUTPATIENT
Start: 2024-06-05 | End: 2024-06-05

## 2024-06-05 RX ORDER — DEXTROSE 50 % IN WATER 50 %
25 SYRINGE (ML) INTRAVENOUS ONCE
Refills: 0 | Status: DISCONTINUED | OUTPATIENT
Start: 2024-06-05 | End: 2024-06-07

## 2024-06-05 RX ORDER — INSULIN LISPRO 100/ML
VIAL (ML) SUBCUTANEOUS AT BEDTIME
Refills: 0 | Status: DISCONTINUED | OUTPATIENT
Start: 2024-06-05 | End: 2024-06-07

## 2024-06-05 RX ORDER — LIDOCAINE HCL 20 MG/ML
90 VIAL (ML) INJECTION ONCE
Refills: 0 | Status: COMPLETED | OUTPATIENT
Start: 2024-06-05 | End: 2024-06-05

## 2024-06-05 RX ORDER — SODIUM CHLORIDE 9 MG/ML
1000 INJECTION, SOLUTION INTRAVENOUS
Refills: 0 | Status: DISCONTINUED | OUTPATIENT
Start: 2024-06-05 | End: 2024-06-07

## 2024-06-05 RX ORDER — PROPOFOL 10 MG/ML
50 INJECTION, EMULSION INTRAVENOUS ONCE
Refills: 0 | Status: COMPLETED | OUTPATIENT
Start: 2024-06-05 | End: 2024-06-05

## 2024-06-05 RX ORDER — ATORVASTATIN CALCIUM 80 MG/1
80 TABLET, FILM COATED ORAL AT BEDTIME
Refills: 0 | Status: DISCONTINUED | OUTPATIENT
Start: 2024-06-05 | End: 2024-06-11

## 2024-06-05 RX ORDER — PROPOFOL 10 MG/ML
20 INJECTION, EMULSION INTRAVENOUS ONCE
Refills: 0 | Status: COMPLETED | OUTPATIENT
Start: 2024-06-05 | End: 2024-06-05

## 2024-06-05 RX ORDER — METOPROLOL TARTRATE 50 MG
50 TABLET ORAL DAILY
Refills: 0 | Status: DISCONTINUED | OUTPATIENT
Start: 2024-06-05 | End: 2024-06-06

## 2024-06-05 RX ORDER — CHLORHEXIDINE GLUCONATE 213 G/1000ML
1 SOLUTION TOPICAL DAILY
Refills: 0 | Status: DISCONTINUED | OUTPATIENT
Start: 2024-06-05 | End: 2024-06-11

## 2024-06-05 RX ORDER — INSULIN LISPRO 100/ML
VIAL (ML) SUBCUTANEOUS
Refills: 0 | Status: DISCONTINUED | OUTPATIENT
Start: 2024-06-05 | End: 2024-06-07

## 2024-06-05 RX ORDER — DEXTROSE 50 % IN WATER 50 %
15 SYRINGE (ML) INTRAVENOUS ONCE
Refills: 0 | Status: DISCONTINUED | OUTPATIENT
Start: 2024-06-05 | End: 2024-06-07

## 2024-06-05 RX ORDER — ASPIRIN/CALCIUM CARB/MAGNESIUM 324 MG
81 TABLET ORAL DAILY
Refills: 0 | Status: DISCONTINUED | OUTPATIENT
Start: 2024-06-05 | End: 2024-06-11

## 2024-06-05 RX ORDER — GLUCAGON INJECTION, SOLUTION 0.5 MG/.1ML
1 INJECTION, SOLUTION SUBCUTANEOUS ONCE
Refills: 0 | Status: DISCONTINUED | OUTPATIENT
Start: 2024-06-05 | End: 2024-06-07

## 2024-06-05 RX ORDER — CLOPIDOGREL BISULFATE 75 MG/1
75 TABLET, FILM COATED ORAL DAILY
Refills: 0 | Status: DISCONTINUED | OUTPATIENT
Start: 2024-06-05 | End: 2024-06-11

## 2024-06-05 RX ORDER — DEXTROSE 50 % IN WATER 50 %
12.5 SYRINGE (ML) INTRAVENOUS ONCE
Refills: 0 | Status: DISCONTINUED | OUTPATIENT
Start: 2024-06-05 | End: 2024-06-07

## 2024-06-05 RX ADMIN — PROPOFOL 50 MILLIGRAM(S): 10 INJECTION, EMULSION INTRAVENOUS at 12:27

## 2024-06-05 RX ADMIN — PROPOFOL 20 MILLIGRAM(S): 10 INJECTION, EMULSION INTRAVENOUS at 12:30

## 2024-06-05 RX ADMIN — ATORVASTATIN CALCIUM 80 MILLIGRAM(S): 80 TABLET, FILM COATED ORAL at 19:20

## 2024-06-05 RX ADMIN — Medication 90 MILLIGRAM(S): at 12:13

## 2024-06-05 RX ADMIN — Medication 81 MILLIGRAM(S): at 19:21

## 2024-06-05 RX ADMIN — ADENOSINE 6 MILLIGRAM(S): 3 INJECTION INTRAVENOUS at 12:11

## 2024-06-05 NOTE — ED PROVIDER NOTE - CONSTITUTIONAL, MLM
normal... Well appearing, awake, alert, oriented to person, place, time/situation and in no apparent distress. Older WM,, awake, alert, oriented to person, place, time/situation and in no apparent distress.

## 2024-06-05 NOTE — ED PROVIDER NOTE - SKIN, MLM
Skin normal color for race, warm, dry and intact. No evidence of rash. Skin normal color for race, warm, dry and intact. No evidence of rash.  No tactile warmth nor diaphoresis

## 2024-06-05 NOTE — PATIENT PROFILE ADULT - FALL HARM RISK - HARM RISK INTERVENTIONS

## 2024-06-05 NOTE — H&P ADULT - ASSESSMENT
54 yo male, recently admitted to  following inferior STEMI 5/21 s/p 1 ADOLFO to mRCA with pEF, who now presented with palpitations, found to be in sustained ventricular tachycardia s/p DCCV 250J with successful conversion to NSR.     Admit to CCU for close cardiac monitoring and critical care intervention in the event of recurrent malignant arrhythmia  Defibrillation pads in place, Zoll monitor at bedside   Per EP SALVATORE Shahid - no need for standing antiarrhythmics at this point  Favor lidocaine over amiodarone until ischemic etiology can be excluded   Ruling out ACS, plan for cardiac catheterized to r/o in-stent thrombosis/ischemic etiology of arrhythmia  No Heparin gtt per EP NP Zehra  Supplement electrolytes to keep goal K >4, Mg >2 to optimize arrhythmia suppression  Post-MI TTE with preserved ejection fraction, will repeat to re-eval LVEF     Plan for urgent cardiac catheterization    Case d/w EP team and CCU Attending Dr. Bliss        CRITICAL CARE TIME SPENT: 62 minutes assessing presenting problems of acute critical illness, which pose high probability of life threatening deterioration or end organ damage/dysfunction, requiring frequent bedside reassessments and adjustments to plan of care, including medical decision making, initiating plan of care, reviewing data, reviewing radiologic exams, discussing with multidisciplinary team, discussing goals of care. Critical Care time is non-inclusive of independent time spent on procedures performed. Date of note entry represents date of services rendered.     54 yo male, recently admitted to  following inferior STEMI 5/21 s/p 1 ADOLFO to mRCA with pEF, who now presented with palpitations, found to be in sustained ventricular tachycardia s/p DCCV 250J with successful conversion to NSR.     Admit to CCU for close cardiac monitoring and critical care intervention in the event of recurrent malignant arrhythmia  Defibrillation pads in place, Zoll monitor at bedside   Per EP SALVATORE Shahid - no need for standing antiarrhythmics at this point - increase toprol to optimize rate control  Favor lidocaine over amiodarone until ischemic etiology can be excluded   Ruling out ACS, plan for cardiac catheterized to r/o in-stent thrombosis/ischemic etiology of arrhythmia  No Heparin gtt per EP SALVATORE Shahid  Supplement electrolytes to keep goal K >4, Mg >2 to optimize arrhythmia suppression  Post-MI TTE with preserved ejection fraction, will repeat to re-eval LVEF     Plan for urgent cardiac catheterization    Case d/w EP team and CCU Attending Dr. Bliss        CRITICAL CARE TIME SPENT: 62 minutes assessing presenting problems of acute critical illness, which pose high probability of life threatening deterioration or end organ damage/dysfunction, requiring frequent bedside reassessments and adjustments to plan of care, including medical decision making, initiating plan of care, reviewing data, reviewing radiologic exams, discussing with multidisciplinary team, discussing goals of care. Critical Care time is non-inclusive of independent time spent on procedures performed. Date of note entry represents date of services rendered.     54 yo male, recently admitted to  following inferior STEMI 5/21 s/p 1 ADOLFO to mRCA with pEF, who now presented with palpitations, found to be in sustained ventricular tachycardia s/p DCCV 250J with successful conversion to NSR.     # Sustained monomorphic VT   # CAD, recent STEMI   # Transaminitis    Admit to CCU for close cardiac monitoring and critical care intervention in the event of recurrent malignant arrhythmia  Defibrillation pads in place, Zoll monitor at bedside   Per EP SALVATORE Shahid - no need for standing antiarrhythmics at this point - increase toprol to optimize rate control  Favor lidocaine over amiodarone until ischemic etiology can be excluded   Ruling out ACS, plan for cardiac catheterized to r/o in-stent thrombosis/ischemic etiology of arrhythmia  No Heparin gtt per EP SALVATORE Shahid  Supplement electrolytes to keep goal K >4, Mg >2 to optimize arrhythmia suppression  Post-MI TTE with preserved ejection fraction, will repeat to re-eval LVEF   Transaminitis ? passive hepatic congestion, monitor end points of perfusion    Plan for urgent cardiac catheterization    Case d/w EP team and CCU Attending Dr. Bliss        CRITICAL CARE TIME SPENT: 62 minutes assessing presenting problems of acute critical illness, which pose high probability of life threatening deterioration or end organ damage/dysfunction, requiring frequent bedside reassessments and adjustments to plan of care, including medical decision making, initiating plan of care, reviewing data, reviewing radiologic exams, discussing with multidisciplinary team, discussing goals of care. Critical Care time is non-inclusive of independent time spent on procedures performed. Date of note entry represents date of services rendered.     52 yo male, recently admitted to  following inferior STEMI 5/21 s/p 1 ADOLFO to mRCA with pEF, who now presented with palpitations, found to be in sustained ventricular tachycardia s/p DCCV 250J with successful conversion to NSR.     # Sustained monomorphic VT   # CAD, recent STEMI   # Transaminitis    Admit to CCU for close cardiac monitoring and critical care intervention in the event of recurrent malignant arrhythmia  Defibrillation pads in place, Zoll monitor at bedside   Per EP SALVATORE Shahid - no need for standing antiarrhythmics at this point - increase toprol to optimize rate control  Favor lidocaine over amiodarone until ischemic etiology can be excluded   Ruling out ACS, plan for cardiac catheterized to r/o in-stent thrombosis/ischemic etiology of arrhythmia  No Heparin gtt per EP SALVATORE Shahid  Supplement electrolytes to keep goal K >4, Mg >2 to optimize arrhythmia suppression  Post-MI TTE with preserved ejection fraction, will repeat to re-eval LVEF   Transaminitis ? passive hepatic congestion, monitor end points of perfusion  Resume DAPT     Plan for urgent cardiac catheterization    Case d/w EP team and CCU Attending Dr. Bliss        CRITICAL CARE TIME SPENT: 62 minutes assessing presenting problems of acute critical illness, which pose high probability of life threatening deterioration or end organ damage/dysfunction, requiring frequent bedside reassessments and adjustments to plan of care, including medical decision making, initiating plan of care, reviewing data, reviewing radiologic exams, discussing with multidisciplinary team, discussing goals of care. Critical Care time is non-inclusive of independent time spent on procedures performed. Date of note entry represents date of services rendered.

## 2024-06-05 NOTE — ED PROCEDURE NOTE - NS_POSTPROCCAREGUIDE_ED_ALL_ED
Patient is now fully awake, with vital signs and temperature stable, hydration is adequate, patients Priscila’s  score is at baseline (or greater than 8), patient and escort has received  discharge education.

## 2024-06-05 NOTE — ED PROVIDER NOTE - CLINICAL SUMMARY MEDICAL DECISION MAKING FREE TEXT BOX
52 y/o male 2 weeks s/p STEMI treated with stent currently on 81mg ASA and Plavix presents BIBA from urgent care with abnormal EKG suspicious for Vtach. Pt c/o palpitations since 7am. NO CP, LOC, SOB. Exam unremarkable other than mild dehydration and tachycardia. No response to Amiodarone 150mg IV PTA. Monitor ?VT. Plan: EKG, chest XR, cardiac labs, EPS consult. 54 y/o male 2 weeks s/p STEMI treated with stent currently on 81mg ASA and Plavix presents BIBA from urgent care with abnormal EKG suspicious for Vtach. Pt c/o palpitations since 7am. NO CP, LOC, SOB. Exam unremarkable other than mild dehydration and tachycardia. No response to Amiodarone 150mg IV PTA. Monitor ?VT. Plan: EKG, chest XR, cardiac labs, EPS consult.    NOREEN Lopez MD:  Elect. cardioversion by EPS under conscious sedation (see Proc. Note & EPS consult) + successful elect. cardioversion to NSR.  Labs notable for elev. WBC 17 & BNP 4000, normal troponin.  ICU PA aware of pt being admitted to CCU, accepted under Dr. Bliss. 52 y/o male 2 weeks s/p STEMI treated with stent currently on 81mg ASA and Plavix, BIBA from Urgent Care with abnormal EKG suspicious for V Tach. Pt c/o palpitations since 7am. NO CP, LOC, SOB. Exam unremarkable other than mild dehydration and tachycardia. No response to Amiodarone 150mg IV PTA. Monitor ?VT.   Plan: EKG, chest XR, cardiac labs, EPS consult.    NOREEN Lopez MD:  Elect. cardioversion by EPS under conscious sedation (see Proc. Note & EPS consult) + successful elect. cardioversion to NSR.  Labs notable for elev. WBC 17 & BNP 4000, normal troponin.  ICU PA aware of pt being admitted to CCU, accepted under Dr. Bliss.

## 2024-06-05 NOTE — H&P ADULT - HISTORY OF PRESENT ILLNESS
52 yo male, recently admitted to  following inferior STEMI 5/21 s/p 1 ADOLFO to [facility] with pEF, who now presented with palpitations. Patient developed palpitations at rest today with associated symptoms of diaphoresis and shortness of breath. His son put his iWatch on him and found his heart rate to be 180 bpm. He presented to Urgent Care for evaluation and was found to be in sustained ventricular tachycardia, prompting transfer to  ED. Of note, patient reports intermittent episodes of palpitations, diaphoresis, and shortness of breath over the past 2-3 days with dyspnea on exertion; denies any episodes of chest pain or LE edema. Patient was given Amiodarone 150mg IVPB by EMS without improvement. In ED, EP was consulted, patient was given Lidocaine without improvement. EP was unsure if rhythm was SVT with aberrancy, therefore given a trial of Adenosine without resolution. Ultimately decision was made to proceed with DCCV 250J with successful conversion to NSR. Initial troponin negative.

## 2024-06-05 NOTE — ED PROVIDER NOTE - CARDIAC, MLM
Tachycardic, Heart sounds S1, S2.  No murmurs, rubs or gallops. Tachycardic, regular. Heart sounds S1, S2.  No murmurs, rubs or gallops. Tachycardic, regular. Heart sounds S1, S2.  No murmurs, rubs or gallops.  Normal radial pulse

## 2024-06-05 NOTE — H&P ADULT - NSICDXPASTMEDICALHX_GEN_ALL_CORE_FT
PAST MEDICAL HISTORY:  Coronary artery disease     Seasonal allergies     ST elevation myocardial infarction (STEMI)     Ventral hernia without obstruction or gangrene

## 2024-06-05 NOTE — CONSULT NOTE ADULT - ASSESSMENT
54 y/o male with a PMHx of seasonal allergies, ventral hernia, 5/21 +STEMI s/p LHC S/P ADOLFO to RCA. He went to urgent care and was found to be in VT, EMS called, amiodarone given in route.  IV lidocaine given, 120 J synch biphasic cardioversion resulted in restoration of sinus rhythm by EP and admitted to CCU for further management.    #   52 y/o male with a PMHx of seasonal allergies, ventral hernia, 5/21 +STEMI s/p [facility] S/P ADOLFO to RCA. He went to urgent care and was found to be in VT, EMS called, amiodarone given in route.  IV lidocaine given, 120 J synch biphasic cardioversion resulted in restoration of sinus rhythm by EP and admitted to CCU for further management.    #Monomorphic VT s/p CV  #CAD. PCI to RCA  #Dyslipidemia  #Pre-Diabetic - a1c 6.2  #Transaminitis - monitor    Monitor on tele  Dr. Dueñas to review cath films - possible [facility] tomorrow  Continue Toprol for now, consideration for sotalol pending ischemic eval  Continue aspirin, plavix, statin  Awaiting echocardiogram  Will follow

## 2024-06-05 NOTE — ED ADULT NURSE NOTE - OBJECTIVE STATEMENT
52 y/o male presenting to ER for WCT. 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 Patient reports he had some gatorade which temporarily alleviated symptoms. Patient reports his son put his Apple Watch on and his HR was 182. Patient went to urgent care, and then they called 911 and referred patient to ED for further evaluation. En route, EMS administered 150 mg Amiodarone for WCT. hx MI, pt was stented at  at that time x2 weeks ago. has been taking clopidogrel, asa, metoprolol & atorvastatin as prescribed. pt denies CP/pressure/palpitations. endorsing mild SOB. at this time, pt in trauma room on cardiac monitor . EKG obtained immediately upon arrival, awaiting EP.

## 2024-06-05 NOTE — ED PROVIDER NOTE - OBJECTIVE STATEMENT
54 y/o male with a PMHx of seasonal allergies, ventral hernia presents to the ED BIBA from urgent care for Vtach. Pt was seen in ED on 5/21, EKG showing +STEMI and pt went to cath lab. Pt s/p LHC S/P ADOLFO to RCA. Pt reports palpitations since 7am. Pt went to urgent care, EKG showing Vtach and pt brought to ED for evaluation. EMS administered Amiodarone 150mg. Denies CP, SOB, syncope. No EtOH use. No drug use. No other complaints at this time. Cardio: Dr. Dueñas. 52 y/o male with a PMHx of seasonal allergies, ventral hernia presents to the ED BIBA from urgent care for Vtach. Pt was seen in ED on 5/21, EKG showing +STEMI and pt went to cath lab. Pt s/p LHC S/P ADOLFO to RCA on 81mg ASA and Plavix. Pt reports palpitations since 7am. Pt went to urgent care, EKG showing Vtach and pt brought to ED for evaluation. EMS administered Amiodarone 150mg. Denies CP, SOB, syncope. No EtOH use. No drug use. No other complaints at this time. Cardio: Dr. Dueñas. 52 y/o male with a PMHx of seasonal allergies, ventral hernia presents to the ED BIBA from Urgent Care for V Tach. Pt was seen in ED on 5/21: EKG +STEMI -> Cath Lab: + LHC w/ ADOLFO to RCA, DC on 81mg ASA and Plavix. Pt reports palpitations since 7am. Pt went to urgent care, EKG showing V Tach and pt brought to ED for evaluation. EMS administered Amiodarone 150mg IV PTA w/o response. Denies CP, SOB, syncope. No EtOH use. No drug use. No other complaints at this time. Cardio: Dr. CIERRA Dueñas.

## 2024-06-05 NOTE — PROCEDURE NOTE - ADDITIONAL PROCEDURE DETAILS
120 J synch biphasic cardioversion resulted in restoration of sinus rhythm.     Plan: To admit to CCU.   Ischemia evaluation as per Dr. Dueñas.

## 2024-06-05 NOTE — ED ADULT TRIAGE NOTE - CHIEF COMPLAINT QUOTE
Pt BIBEMS c/o fast heart rate. Per EMS pt was seen at urgent care and was told to come to . Pt heart rate 167. Pt was given 150 of amiodarone PTA. Denies cp, n/v/d. Endorses SOB. Pt placed in trauma room. MD Lopez at bedside. Heart attack last week, 1 stent was placed.

## 2024-06-05 NOTE — ED PROVIDER NOTE - MUSCULOSKELETAL, MLM
Spine appears normal, range of motion is not limited, no muscle or joint tenderness Spine appears normal, range of motion is not limited, no muscle or joint tenderness.  GUAMAN x 4, no focal extremity swelling nor tenderness

## 2024-06-05 NOTE — ED ADULT NURSE NOTE - NSFALLHARMRISKINTERV_ED_ALL_ED

## 2024-06-05 NOTE — ED ADULT TRIAGE NOTE - NS ED NURSE AMBULANCES
[facility]

## 2024-06-05 NOTE — PROCEDURAL SAFETY CHECKLIST WITH OR WITHOUT SEDATION - NSPOSTCOMMENTFT_GEN_ALL_CORE
Patient tolerated procedure well. Was sedated with Propofol as per eMAR, remained on continuous cardiac monitoring with suction, o2, ambu at bedside throughout. Converted from VT to NSR s/p x1 shock.

## 2024-06-06 LAB
ALBUMIN SERPL ELPH-MCNC: 3.2 G/DL — LOW (ref 3.3–5)
ALP SERPL-CCNC: 54 U/L — SIGNIFICANT CHANGE UP (ref 40–120)
ALT FLD-CCNC: 98 U/L — HIGH (ref 12–78)
ANION GAP SERPL CALC-SCNC: 4 MMOL/L — LOW (ref 5–17)
AST SERPL-CCNC: 43 U/L — HIGH (ref 15–37)
BASOPHILS # BLD AUTO: 0.05 K/UL — SIGNIFICANT CHANGE UP (ref 0–0.2)
BASOPHILS NFR BLD AUTO: 0.4 % — SIGNIFICANT CHANGE UP (ref 0–2)
BILIRUB SERPL-MCNC: 0.9 MG/DL — SIGNIFICANT CHANGE UP (ref 0.2–1.2)
BLD GP AB SCN SERPL QL: SIGNIFICANT CHANGE UP
BUN SERPL-MCNC: 13 MG/DL — SIGNIFICANT CHANGE UP (ref 7–23)
CALCIUM SERPL-MCNC: 8.7 MG/DL — SIGNIFICANT CHANGE UP (ref 8.5–10.1)
CHLORIDE SERPL-SCNC: 109 MMOL/L — HIGH (ref 96–108)
CK SERPL-CCNC: 139 U/L — SIGNIFICANT CHANGE UP (ref 26–308)
CO2 SERPL-SCNC: 25 MMOL/L — SIGNIFICANT CHANGE UP (ref 22–31)
CREAT SERPL-MCNC: 0.78 MG/DL — SIGNIFICANT CHANGE UP (ref 0.5–1.3)
EGFR: 108 ML/MIN/1.73M2 — SIGNIFICANT CHANGE UP
EOSINOPHIL # BLD AUTO: 0.04 K/UL — SIGNIFICANT CHANGE UP (ref 0–0.5)
EOSINOPHIL NFR BLD AUTO: 0.3 % — SIGNIFICANT CHANGE UP (ref 0–6)
GLUCOSE BLDC GLUCOMTR-MCNC: 112 MG/DL — HIGH (ref 70–99)
GLUCOSE BLDC GLUCOMTR-MCNC: 126 MG/DL — HIGH (ref 70–99)
GLUCOSE BLDC GLUCOMTR-MCNC: 185 MG/DL — HIGH (ref 70–99)
GLUCOSE SERPL-MCNC: 117 MG/DL — HIGH (ref 70–99)
HCT VFR BLD CALC: 42.3 % — SIGNIFICANT CHANGE UP (ref 39–50)
HGB BLD-MCNC: 14.5 G/DL — SIGNIFICANT CHANGE UP (ref 13–17)
IMM GRANULOCYTES NFR BLD AUTO: 0.5 % — SIGNIFICANT CHANGE UP (ref 0–0.9)
LYMPHOCYTES # BLD AUTO: 1.37 K/UL — SIGNIFICANT CHANGE UP (ref 1–3.3)
LYMPHOCYTES # BLD AUTO: 10.3 % — LOW (ref 13–44)
MAGNESIUM SERPL-MCNC: 1.9 MG/DL — SIGNIFICANT CHANGE UP (ref 1.6–2.6)
MCHC RBC-ENTMCNC: 32.1 PG — SIGNIFICANT CHANGE UP (ref 27–34)
MCHC RBC-ENTMCNC: 34.3 GM/DL — SIGNIFICANT CHANGE UP (ref 32–36)
MCV RBC AUTO: 93.6 FL — SIGNIFICANT CHANGE UP (ref 80–100)
MONOCYTES # BLD AUTO: 1.14 K/UL — HIGH (ref 0–0.9)
MONOCYTES NFR BLD AUTO: 8.6 % — SIGNIFICANT CHANGE UP (ref 2–14)
NEUTROPHILS # BLD AUTO: 10.67 K/UL — HIGH (ref 1.8–7.4)
NEUTROPHILS NFR BLD AUTO: 79.9 % — HIGH (ref 43–77)
PHOSPHATE SERPL-MCNC: 2.7 MG/DL — SIGNIFICANT CHANGE UP (ref 2.5–4.5)
PLATELET # BLD AUTO: 233 K/UL — SIGNIFICANT CHANGE UP (ref 150–400)
POTASSIUM SERPL-MCNC: 3.9 MMOL/L — SIGNIFICANT CHANGE UP (ref 3.5–5.3)
POTASSIUM SERPL-SCNC: 3.9 MMOL/L — SIGNIFICANT CHANGE UP (ref 3.5–5.3)
PROT SERPL-MCNC: 6.4 GM/DL — SIGNIFICANT CHANGE UP (ref 6–8.3)
RBC # BLD: 4.52 M/UL — SIGNIFICANT CHANGE UP (ref 4.2–5.8)
RBC # FLD: 12.6 % — SIGNIFICANT CHANGE UP (ref 10.3–14.5)
SODIUM SERPL-SCNC: 138 MMOL/L — SIGNIFICANT CHANGE UP (ref 135–145)
TROPONIN I, HIGH SENSITIVITY RESULT: 96.19 NG/L — HIGH
WBC # BLD: 13.33 K/UL — HIGH (ref 3.8–10.5)
WBC # FLD AUTO: 13.33 K/UL — HIGH (ref 3.8–10.5)

## 2024-06-06 PROCEDURE — 99223 1ST HOSP IP/OBS HIGH 75: CPT

## 2024-06-06 PROCEDURE — 93010 ELECTROCARDIOGRAM REPORT: CPT

## 2024-06-06 RX ORDER — MAGNESIUM SULFATE 500 MG/ML
1 VIAL (ML) INJECTION ONCE
Refills: 0 | Status: COMPLETED | OUTPATIENT
Start: 2024-06-06 | End: 2024-06-06

## 2024-06-06 RX ORDER — POTASSIUM CHLORIDE 20 MEQ
40 PACKET (EA) ORAL DAILY
Refills: 0 | Status: COMPLETED | OUTPATIENT
Start: 2024-06-06 | End: 2024-06-06

## 2024-06-06 RX ORDER — SODIUM CHLORIDE 9 MG/ML
1000 INJECTION INTRAMUSCULAR; INTRAVENOUS; SUBCUTANEOUS
Refills: 0 | Status: DISCONTINUED | OUTPATIENT
Start: 2024-06-06 | End: 2024-06-07

## 2024-06-06 RX ORDER — ALPRAZOLAM 0.25 MG
0.25 TABLET ORAL AT BEDTIME
Refills: 0 | Status: DISCONTINUED | OUTPATIENT
Start: 2024-06-06 | End: 2024-06-07

## 2024-06-06 RX ORDER — SOTALOL HCL 120 MG
80 TABLET ORAL EVERY 12 HOURS
Refills: 0 | Status: DISCONTINUED | OUTPATIENT
Start: 2024-06-07 | End: 2024-06-11

## 2024-06-06 RX ORDER — SODIUM CHLORIDE 9 MG/ML
250 INJECTION INTRAMUSCULAR; INTRAVENOUS; SUBCUTANEOUS ONCE
Refills: 0 | Status: COMPLETED | OUTPATIENT
Start: 2024-06-06 | End: 2024-06-06

## 2024-06-06 RX ADMIN — Medication 100 GRAM(S): at 08:23

## 2024-06-06 RX ADMIN — CLOPIDOGREL BISULFATE 75 MILLIGRAM(S): 75 TABLET, FILM COATED ORAL at 09:56

## 2024-06-06 RX ADMIN — Medication 40 MILLIEQUIVALENT(S): at 14:45

## 2024-06-06 RX ADMIN — Medication 81 MILLIGRAM(S): at 09:56

## 2024-06-06 RX ADMIN — SODIUM CHLORIDE 250 MILLILITER(S): 9 INJECTION INTRAMUSCULAR; INTRAVENOUS; SUBCUTANEOUS at 15:31

## 2024-06-06 RX ADMIN — SODIUM CHLORIDE 200 MILLILITER(S): 9 INJECTION INTRAMUSCULAR; INTRAVENOUS; SUBCUTANEOUS at 18:26

## 2024-06-06 RX ADMIN — Medication 0.25 MILLIGRAM(S): at 21:39

## 2024-06-06 RX ADMIN — ATORVASTATIN CALCIUM 80 MILLIGRAM(S): 80 TABLET, FILM COATED ORAL at 21:39

## 2024-06-06 RX ADMIN — Medication 50 MILLIGRAM(S): at 09:56

## 2024-06-06 RX ADMIN — CHLORHEXIDINE GLUCONATE 1 APPLICATION(S): 213 SOLUTION TOPICAL at 12:52

## 2024-06-06 NOTE — PROGRESS NOTE ADULT - ASSESSMENT
A:    53yMale  HD #    Here for:    1.    This patient requires critical care for support of one or more vital organ systems with a high probability of imminent or life threatening deterioration in his/her condition    P:    Neuro: GCS 15. Monitor for delirium.  Continue to optimize pain control. Serial Neurologic assessments.    HEENT: No issues.    CV: Continue hemodynamic monitoring    Pulm: Pulmonary toilet.  Continue incentive spirometer.  Chest PT.  Encourage OOB to chair and ambulation. Nebs. f/u ABG, CXR.    GI/Nutrition: Cont diet, bowel regimen.    /Renal: Monitor UOP. Monitor BMP.  Replete Lytes as needed.    HEME- Chemical and mechanical DVT ppx. f/u CBC. f/u coags as needed.    ID:  Cont abx. f/u Cx's.    Lines/Tubes:     Endo: Maintain euglycemia.    Skin:  Cont skin care, pressure ulcer prevention.    Dispo: Cont critical care.    Date of entry of this note is equal to the date of services rendered    TOTAL CRITICAL CARE TIME:   (EXCLUSIVE of any non bundled procedures)    Note: This is a critically ill patient. Time spent has been in salvage of life, limb and vital organ systems. This time INCLUDES time spent directly as this patient's bedside with evaluation and management, review of chart including review of laboratory and imaging studies, interpretation of vital signs and cardiac output measurements, any necessary ventilator management, and time spent discussing plan of care with patient and family, including goals of care discussion. This also includes time spent in multidisciplinary discussion with care team and various consultants to optimize treatment plan. This time may NOT include various procedures, which will be noted seperately.

## 2024-06-06 NOTE — CONSULT NOTE ADULT - CONSULT REQUESTED DATE/TIME
Recommended weight and BMI control through healthy diet and exercise, green leafy veggies, UV protection, and not smoking. Reviewed the benefits of AREDS II VITAMINS VERUS GENOTYPE directed vitamin therapy, and recommended following one or the other to try and prevent the progression of the disease. Reviewed the importance of daily monitoring of the vision in each eye independently, along with the use of the Amsler grid daily and instructed patient to call and return immediately for any new changes in their vision or on the Amsler grid. Patient instructed on the importance of regular follow up and monitoring for the early detection of conversion to wet AMD as early detection results in early treatment and better outcomes. 05-Jun-2024 08:43

## 2024-06-06 NOTE — CONSULT NOTE ADULT - SUBJECTIVE AND OBJECTIVE BOX
CHIEF COMPLAINT: Patient is a 53y old  Male who presents with a chief complaint of     FROM H&P: 54 y/o male with a PMHx of seasonal allergies, ventral hernia presents to the ED BIBA from urgent care for Vtach. Pt was seen in ED on 5/21, EKG showing +STEMI and pt went to cath lab. Pt s/p LHC S/P ADOLFO to RCA on 81mg ASA and Plavix. Pt reports palpitations since 7am. Pt went to urgent care, EKG showing Vtach and pt brought to ED for evaluation. EMS administered Amiodarone 150mg. Denies CP, SOB, syncope. No EtOH use. No drug use. No other complaints at this time. Cardio: Dr. Dueñas.  IV lidocaine given, 120 J synch biphasic cardioversion resulted in restoration of sinus rhythm by EP.    6/5. Doing ok, a little tired.   No CP or SOB at the moment. SR    MEDICATIONS  (STANDING):  aspirin enteric coated 81 milliGRAM(s) Oral daily  atorvastatin 80 milliGRAM(s) Oral at bedtime  chlorhexidine 4% Liquid 1 Application(s) Topical daily  clopidogrel Tablet 75 milliGRAM(s) Oral daily  dextrose 10% Bolus 125 milliLiter(s) IV Bolus once  dextrose 5%. 1000 milliLiter(s) (50 mL/Hr) IV Continuous <Continuous>  dextrose 5%. 1000 milliLiter(s) (100 mL/Hr) IV Continuous <Continuous>  dextrose 50% Injectable 25 Gram(s) IV Push once  dextrose 50% Injectable 12.5 Gram(s) IV Push once  glucagon  Injectable 1 milliGRAM(s) IntraMuscular once  insulin lispro (ADMELOG) corrective regimen sliding scale   SubCutaneous three times a day before meals  insulin lispro (ADMELOG) corrective regimen sliding scale   SubCutaneous at bedtime  metoprolol succinate ER 50 milliGRAM(s) Oral daily  pantoprazole    Tablet 40 milliGRAM(s) Oral before breakfast    MEDICATIONS  (PRN):  dextrose Oral Gel 15 Gram(s) Oral once PRN Blood Glucose LESS THAN 70 milliGRAM(s)/deciliter    PHYSICAL EXAM:  Vital Signs Last 24 Hrs  T(C): 36.9 (05 Jun 2024 16:20), Max: 37.2 (05 Jun 2024 13:45)  T(F): 98.4 (05 Jun 2024 16:20), Max: 99 (05 Jun 2024 13:45)  HR: 89 (05 Jun 2024 13:45) (88 - 168)  BP: 133/90 (05 Jun 2024 13:45) (104/79 - 133/90)  BP(mean): 103 (05 Jun 2024 13:45) (86 - 103)  RR: 20 (05 Jun 2024 13:45) (19 - 27)  SpO2: 99% (05 Jun 2024 13:45) (94% - 99%)    Parameters below as of 05 Jun 2024 13:45  Patient On (Oxygen Delivery Method): room air    Constitutional: NAD, awake and alert  HEENT: PERR, EOMI, Normal Hearing, MMM  Neck: Soft and supple, No LAD, No JVD  Respiratory: Breath sounds are clear bilaterally, No wheezing, rales or rhonchi  Cardiovascular: S1 and S2, regular rate and rhythm, no Murmurs, gallops or rubs  Gastrointestinal: Bowel Sounds present, soft, nontender, nondistended, no guarding, no rebound  Extremities: No peripheral edema  Vascular: 2+ peripheral pulses  Neurological: A/O x 3, no focal deficits  Musculoskeletal: 5/5 strength b/l upper and lower extremities  Skin: No rashes    =======================================    INTERPRETATION OF TELEMETRY: SR    ========================================    LABS:                        14.8   17.34 )-----------( 334      ( 05 Jun 2024 11:54 )             44.0     06-05    135  |  103  |  20  ----------------------------<  220<H>  3.9   |  25  |  1.17    Ca    9.3      05 Jun 2024 11:54  Phos  3.3     06-05  Mg     2.2     06-05    TPro  7.1  /  Alb  3.7  /  TBili  0.9  /  DBili  x   /  AST  52<H>  /  ALT  131<H>  /  AlkPhos  64  06-05    PT/INR - ( 05 Jun 2024 11:54 )   PT: 12.4 sec;   INR: 1.10 ratio       PTT - ( 05 Jun 2024 11:54 )  PTT:31.3 sec    CARDIAC TESTING:    < from: TTE W or WO Ultrasound Enhancing Agent (05.21.24 @ 15:30) >  1. Left ventricular systolic function is normal with an ejection fraction of 55 % by Kennedy's method of disks.   2. Normal right ventricular cavity size and normal systolic function.   3. Mild mitral regurgitation.   4. Mild to moderate aortic regurgitation.   5. Mild to moderate left ventricular hypertrophy.   6. Thereis akinesis of the inferior wall but overall preserved LV function.      < from: Cardiac Catheterization (05.21.24 @ 10:51) >  Conclusions:   There is significant triple vessel coronary artery disease.A  successful intervention of the RCA was performed.RPDA appears a   and fills retrograde.   Recommendations:   Aggressive risk factor modification with diet, exercise, and a goal  LDL of less than 70.; Dual antiplatelet therapy should be  continued for a minimum of 6 months     RADIOLOGY & ADDITIONAL STUDIES:    
53y Male who had SOB which began at 7am on 6/5/24.  He called his primary cardiology group who advised him to go to the ED.  He went to urgent care and was brought to  by ambulance for WCT. In the ambulance he was given Amiodarone 150 mgs.  Upon arrival to ED Dr. Chaudhary gave Adenosine for WCT at a rate of 160-170 BPM,  followed by Lidocaine 90 mgs. His heart rate decreased but remained in WCT.  He was then sedated and DC CV with 120J which successfully CV him to SR.  IN May 2024 he was admitted with a STEMII and had PCI here at .  He has been complaint with his medications.  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     PAST MEDICAL & SURGICAL HISTORY:  Seasonal allergies  Ventral hernia without obstruction or gangrene  ST elevation myocardial infarction (STEMI)  Coronary artery disease  Deviated nasal septum-h/o          FAMILY HISTORY:  No pertinent family history in first degree relatives    SOCIAL HISTORY: Denies tobacco, etoh abuse or illicit drug use    MEDICATIONS  (STANDING):  aspirin enteric coated 81 milliGRAM(s) Oral daily  atorvastatin 80 milliGRAM(s) Oral at bedtime  chlorhexidine 4% Liquid 1 Application(s) Topical daily  clopidogrel Tablet 75 milliGRAM(s) Oral daily  dextrose 10% Bolus 125 milliLiter(s) IV Bolus once  dextrose 5%. 1000 milliLiter(s) (50 mL/Hr) IV Continuous <Continuous>  dextrose 5%. 1000 milliLiter(s) (100 mL/Hr) IV Continuous <Continuous>  dextrose 50% Injectable 12.5 Gram(s) IV Push once  dextrose 50% Injectable 25 Gram(s) IV Push once  glucagon  Injectable 1 milliGRAM(s) IntraMuscular once  insulin lispro (ADMELOG) corrective regimen sliding scale   SubCutaneous three times a day before meals  insulin lispro (ADMELOG) corrective regimen sliding scale   SubCutaneous at bedtime  metoprolol succinate ER 50 milliGRAM(s) Oral daily  pantoprazole    Tablet 40 milliGRAM(s) Oral before breakfast    MEDICATIONS  (PRN):  dextrose Oral Gel 15 Gram(s) Oral once PRN Blood Glucose LESS THAN 70 milliGRAM(s)/deciliter      Allergies    No Known Allergies    Intolerances                Vital Signs Last 24 Hrs  T(C): 37.1 (06 Jun 2024 07:18), Max: 37.4 (05 Jun 2024 19:09)  T(F): 98.7 (06 Jun 2024 07:18), Max: 99.3 (05 Jun 2024 19:09)  HR: 82 (06 Jun 2024 08:00) (82 - 168)  BP: 133/95 (06 Jun 2024 08:00) (104/79 - 143/76)  BP(mean): 107 (06 Jun 2024 08:00) (84 - 107)  RR: 24 (06 Jun 2024 08:00) (0 - 35)  SpO2: 94% (06 Jun 2024 08:00) (89% - 99%)    Parameters below as of 06 Jun 2024 08:00  Patient On (Oxygen Delivery Method): nasal cannula  O2 Flow (L/min): 2      REVIEW OF SYSTEMS:    CONSTITUTIONAL:  As per HPI.  HEENT:  Eyes:  No diplopia or blurred vision. ENT:  No earache, sore throat or runny nose.  CARDIOVASCULAR:  No pressure, squeezing, strangling, tightness, heaviness or aching about the chest, neck, axilla or epigastrium.  RESPIRATORY:  No cough, shortness of breath, PND or orthopnea.  GASTROINTESTINAL:  No nausea, vomiting or diarrhea.  GENITOURINARY:  No dysuria, frequency or urgency.  MUSCULOSKELETAL:  As per HPI.  SKIN:  No change in skin, hair or nails.  NEUROLOGIC:  No paresthesias, fasciculations, seizures or weakness.  PSYCHIATRIC:  No disorder of thought or mood.  ENDOCRINE:  No heat or cold intolerance, polyuria or polydipsia.  HEMATOLOGICAL:  No easy bruising or bleedings:  .     PHYSICAL EXAMINATION:    GENERAL APPEARANCE:  Pt. is not currently dyspneic, in no distress. Pt. is alert, oriented, and pleasant.  HEENT:  Pupils are normal and react normally. No icterus. Mucous membranes well colored.  NECK:  Supple. No lymphadenopathy. Jugular venous pressure not elevated. Carotids equal.   HEART:   The cardiac impulse has a normal quality. There are no murmurs, rubs or gallops noted  CHEST:  Chest is clear to auscultation. Normal respiratory effort.  ABDOMEN:  Soft and nontender.   EXTREMITIES:  There is no edema.   SKIN:  No rash or significant lesions are noted.    I&O's Summary    05 Jun 2024 07:01  -  06 Jun 2024 07:00  --------------------------------------------------------  IN: 300 mL / OUT: 1100 mL / NET: -800 mL    06 Jun 2024 07:01  -  06 Jun 2024 08:44  --------------------------------------------------------  IN: 0 mL / OUT: 400 mL / NET: -400 mL        LABS:                        14.5   13.33 )-----------( 233      ( 06 Jun 2024 05:29 )             42.3     06-06    138  |  109<H>  |  13  ----------------------------<  117<H>  3.9   |  25  |  0.78    Ca    8.7      06 Jun 2024 05:29  Phos  2.7     06-06  Mg     1.9     06-06    TPro  6.4  /  Alb  3.2<L>  /  TBili  0.9  /  DBili  x   /  AST  43<H>  /  ALT  98<H>  /  AlkPhos  54  06-06    LIVER FUNCTIONS - ( 06 Jun 2024 05:29 )  Alb: 3.2 g/dL / Pro: 6.4 gm/dL / ALK PHOS: 54 U/L / ALT: 98 U/L / AST: 43 U/L / GGT: x           PT/INR - ( 05 Jun 2024 11:54 )   PT: 12.4 sec;   INR: 1.10 ratio         PTT - ( 05 Jun 2024 11:54 )  PTT:31.3 sec  CARDIAC MARKERS ( 06 Jun 2024 05:29 )  x     / x     / 139 U/L / x     / x      CARDIAC MARKERS ( 05 Jun 2024 20:07 )  x     / x     / 183 U/L / x     / x          Urinalysis Basic - ( 06 Jun 2024 05:29 )    Color: x / Appearance: x / SG: x / pH: x  Gluc: 117 mg/dL / Ketone: x  / Bili: x / Urobili: x   Blood: x / Protein: x / Nitrite: x   Leuk Esterase: x / RBC: x / WBC x   Sq Epi: x / Non Sq Epi: x / Bacteria: x          EKG:  WCT at a    TELEMETRY:  WCT -> SR after DC CV    CARDIAC TESTS:    RADIOLOGY & ADDITIONAL STUDIES:  < from: TTE W or WO Ultrasound Enhancing Agent (05.21.24 @ 15:30) >    _______________________________________________________________________________________     CONCLUSIONS:      1. Left ventricular systolic function is normal with an ejection fraction of 55 % by Kennedy's method of disks.   2. Normal right ventricular cavity size and normal systolic function.   3. Mild mitral regurgitation.   4. Mild to moderate aortic regurgitation.   5. Mild to moderate left ventricular hypertrophy.   6. Thereis akinesis of the inferior wall but overall preserved LV function.    < end of copied text >

## 2024-06-06 NOTE — PROGRESS NOTE ADULT - SUBJECTIVE AND OBJECTIVE BOX
ICU  Note    HPI:    S:    Pt seen and examined  HD #  53yMale  Pt here for        Allergies    No Known Allergies    Intolerances        MEDICATIONS  (STANDING):  aspirin enteric coated 81 milliGRAM(s) Oral daily  atorvastatin 80 milliGRAM(s) Oral at bedtime  chlorhexidine 4% Liquid 1 Application(s) Topical daily  clopidogrel Tablet 75 milliGRAM(s) Oral daily  dextrose 10% Bolus 125 milliLiter(s) IV Bolus once  dextrose 5%. 1000 milliLiter(s) (100 mL/Hr) IV Continuous <Continuous>  dextrose 5%. 1000 milliLiter(s) (50 mL/Hr) IV Continuous <Continuous>  dextrose 50% Injectable 25 Gram(s) IV Push once  dextrose 50% Injectable 12.5 Gram(s) IV Push once  glucagon  Injectable 1 milliGRAM(s) IntraMuscular once  insulin lispro (ADMELOG) corrective regimen sliding scale   SubCutaneous at bedtime  insulin lispro (ADMELOG) corrective regimen sliding scale   SubCutaneous three times a day before meals  metoprolol succinate ER 50 milliGRAM(s) Oral daily  pantoprazole    Tablet 40 milliGRAM(s) Oral before breakfast    MEDICATIONS  (PRN):  dextrose Oral Gel 15 Gram(s) Oral once PRN Blood Glucose LESS THAN 70 milliGRAM(s)/deciliter      Drug Dosing Weight  Height (cm): 177.8 (05 Jun 2024 11:51)  Weight (kg): 66.8 (05 Jun 2024 11:51)  BMI (kg/m2): 21.1 (05 Jun 2024 11:51)  BSA (m2): 1.83 (05 Jun 2024 11:51)    CENTRAL LINE: [ ] YES [ ] NO  LOCATION:   DATE INSERTED:  REMOVE: [ ] YES [ ] NO  EXPLAIN:    OLEA: [ ] YES [ ] NO    DATE INSERTED:  REMOVE: [ ] YES [ ] NO  EXPLAIN:    A-LINE: [ ] YES [ ] NO  LOCATION:   DATE INSERTED:  REMOVE: [ ] YES [ ] NO  EXPLAIN:    PAST MEDICAL & SURGICAL HISTORY:  Seasonal allergies      Ventral hernia without obstruction or gangrene      ST elevation myocardial infarction (STEMI)      Coronary artery disease      Deviated nasal septum  h/o          FAMILY HISTORY:  No pertinent family history in first degree relatives          REVIEW OF SYSTEMS    UNLESS OTHERWISE NOTED IN HPI above:    Constitutional:  No Weight Change, No Fever, No Chills, No Night Sweats, No Fatigue, No Malaise  ENT/Mouth:  No Hearing Changes, No Ear Pain, No Nasal Congestion, No  Sinus Pain, No Hoarseness, No sore throat, No Rhinorrhea, No Swallowing  Difficulty  Eyes:  No Eye Pain, No Swelling, No Redness, No Foreign Body, No Discharge, No Vision Changes  Cardiovascular:  No Chest Pain, No SOB, No PND, No Dyspnea on Exertion,  No Orthopnea, No Claudication, No Edema, No Palpitations  Respiratory:  No Cough, No Sputum, No Wheezing, No Smoke Exposure, No Dyspnea  Gastrointestinal:  No Nausea, No Vomiting, No Diarrhea, No  Constipation, No Pain, No Heartburn, No Anorexia, No Dysphagia, No  Hematochezia, No Melena, No Flatulence, No Jaundice  Genitourinary:  No Dysmenorrhea, No DUB, No Dyspareunia, No Dysuria, No  Urinary Frequency, No Hematuria, No Urinary Incontinence, No Urgency,  No Flank Pain, No Urinary Flow Changes, No Hesitancy  Musculoskeletal:  No Arthralgias, No Myalgias, No Joint Swelling, No  Joint Stiffness, No Back Pain, No Neck Pain, No Injury History  Skin:  No Skin Lesions, No Pruritis, No Hair Changes, No Breast/Skin Changes, No Nipple Discharge  Neuro:  No Weakness, No Numbness, No Paresthesias, No Loss of  Consciousness, No Syncope, No Dizziness, No Headache, No Coordination  Changes, No Recent Falls  Psych:  No Anxiety/Panic, No Depression, No Insomnia, No Personality  Changes, No Delusions, No Rumination, No SI/HI/AH/VH, No Social Issues,  No Memory Changes, No Violence/Abuse Hx., No Eating Concerns  Heme/Lymph:  No Bruising, No Bleeding, No Transfusions History, No Lymphadenopathy  Endocrine:  No Polyuria, No Polydipsia, No Temperature Intolerance    O:    ICU Vital Signs Last 24 Hrs  T(C): 36.7 (06 Jun 2024 15:52), Max: 37.4 (05 Jun 2024 19:09)  T(F): 98.1 (06 Jun 2024 15:52), Max: 99.3 (05 Jun 2024 19:09)  HR: 80 (06 Jun 2024 16:00) (80 - 97)  BP: 120/83 (06 Jun 2024 16:00) (110/71 - 143/76)  BP(mean): 96 (06 Jun 2024 16:00) (84 - 109)  ABP: --  ABP(mean): --  RR: 28 (06 Jun 2024 16:00) (22 - 35)  SpO2: 96% (06 Jun 2024 16:00) (89% - 97%)    O2 Parameters below as of 06 Jun 2024 16:00  Patient On (Oxygen Delivery Method): nasal cannula  O2 Flow (L/min): 2              I&O's Detail    05 Jun 2024 07:01  -  06 Jun 2024 07:00  --------------------------------------------------------  IN:    Oral Fluid: 300 mL  Total IN: 300 mL    OUT:    Voided (mL): 1100 mL  Total OUT: 1100 mL    Total NET: -800 mL      06 Jun 2024 07:01  -  06 Jun 2024 17:36  --------------------------------------------------------  IN:    Sodium Chloride 0.9% Bolus: 250 mL  Total IN: 250 mL    OUT:    Voided (mL): 2100 mL  Total OUT: 2100 mL    Total NET: -1850 mL              PE:    Adult lying in bed  No JVD trachea midline  Normocephalic, atraumatic  S1S2+  CTA B/L  Abd soft NTND  No leg swelling/edema noted  Awake and alert  Skin pink, warm    LABS:    CBC Full  -  ( 06 Jun 2024 05:29 )  WBC Count : 13.33 K/uL  RBC Count : 4.52 M/uL  Hemoglobin : 14.5 g/dL  Hematocrit : 42.3 %  Platelet Count - Automated : 233 K/uL  Mean Cell Volume : 93.6 fl  Mean Cell Hemoglobin : 32.1 pg  Mean Cell Hemoglobin Concentration : 34.3 gm/dL  Auto Neutrophil # : 10.67 K/uL  Auto Lymphocyte # : 1.37 K/uL  Auto Monocyte # : 1.14 K/uL  Auto Eosinophil # : 0.04 K/uL  Auto Basophil # : 0.05 K/uL  Auto Neutrophil % : 79.9 %  Auto Lymphocyte % : 10.3 %  Auto Monocyte % : 8.6 %  Auto Eosinophil % : 0.3 %  Auto Basophil % : 0.4 %    06-06    138  |  109<H>  |  13  ----------------------------<  117<H>  3.9   |  25  |  0.78    Ca    8.7      06 Jun 2024 05:29  Phos  2.7     06-06  Mg     1.9     06-06    TPro  6.4  /  Alb  3.2<L>  /  TBili  0.9  /  DBili  x   /  AST  43<H>  /  ALT  98<H>  /  AlkPhos  54  06-06    PT/INR - ( 05 Jun 2024 11:54 )   PT: 12.4 sec;   INR: 1.10 ratio         PTT - ( 05 Jun 2024 11:54 )  PTT:31.3 sec  Urinalysis Basic - ( 06 Jun 2024 05:29 )    Color: x / Appearance: x / SG: x / pH: x  Gluc: 117 mg/dL / Ketone: x  / Bili: x / Urobili: x   Blood: x / Protein: x / Nitrite: x   Leuk Esterase: x / RBC: x / WBC x   Sq Epi: x / Non Sq Epi: x / Bacteria: x      CAPILLARY BLOOD GLUCOSE      POCT Blood Glucose.: 112 mg/dL (06 Jun 2024 11:30)  POCT Blood Glucose.: 126 mg/dL (06 Jun 2024 08:12)  POCT Blood Glucose.: 92 mg/dL (05 Jun 2024 21:23)  POCT Blood Glucose.: 78 mg/dL (05 Jun 2024 19:12)    CARDIAC MARKERS ( 06 Jun 2024 05:29 )  x     / x     / 139 U/L / x     / x      CARDIAC MARKERS ( 05 Jun 2024 20:07 )  x     / x     / 183 U/L / x     / x          LIVER FUNCTIONS - ( 06 Jun 2024 05:29 )  Alb: 3.2 g/dL / Pro: 6.4 gm/dL / ALK PHOS: 54 U/L / ALT: 98 U/L / AST: 43 U/L / GGT: x

## 2024-06-06 NOTE — BRIEF OPERATIVE NOTE - NSICDXBRIEFPOSTOP_GEN_ALL_CORE_FT
POST-OP DIAGNOSIS:  Nonobstructive atherosclerosis of coronary artery 06-Jun-2024 17:47:56  Gibson Vásquez-Noé

## 2024-06-06 NOTE — CONSULT NOTE ADULT - ASSESSMENT
53 year old male admitted  with WCT it was monomorphic VT. He has Adenosine Lidocaine and was cardioverted for sustained VT to SR with 120 V.   His LVEF in May 2024 was 55%. Past Med Hx: In May 2024 has STEMI followed by PCI to RCI and pre Diabetes.  Pt is HF -post likely diastolic, BNP 4828 and Transaminitis     Plan:  Toprol was increased from 25 to 50 mgs  Continue to monitor   Pt will have repeat echo and ischemic evaluation  Echo repeat was requested   Monitor liver enzyme sand WBC    53 year old male admitted  with WCT it was monomorphic VT. He has Adenosine Lidocaine and was cardioverted for sustained VT to SR with 120 V.   His LVEF in May 2024 was 55%. Past Med Hx: In May 2024 has STEMI followed by PCI to RCI and pre Diabetes.  Pt is HF -post likely diastolic, BNP 4828 and Transaminitis     Plan:  Toprol was increased from 25 to 50 mgs  Continue to monitor   Pt will have repeat echo and ischemic evaluation  Echo repeat was requested   Monitor liver enzyme sand WBC  Pending results of cardiac cath-may consider sotalol load  If Cath negative will be a candidate for ICD

## 2024-06-06 NOTE — PROGRESS NOTE ADULT - ASSESSMENT
52 y/o male with a PMHx of seasonal allergies, ventral hernia, 5/21 +STEMI s/p [facility] S/P ADOLFO to RCA. He went to urgent care and was found to be in VT, EMS called, amiodarone given in route.  IV lidocaine given, 120 J synch biphasic cardioversion resulted in restoration of sinus rhythm by EP and admitted to CCU for further management.    #Monomorphic VT s/p CV  #CAD. PCI to RCA  #Dyslipidemia  #Pre-Diabetic - a1c 6.2  #Transaminitis - monitor    Monitor on tele  Dr. Dueñas to review cath films - possible [facility] tomorrow  Continue Toprol for now, consideration for sotalol pending ischemic eval  Continue aspirin, plavix, statin  Awaiting echocardiogram  Will follow   54 y/o male with a PMHx of seasonal allergies, ventral hernia, 5/21 +STEMI s/p [facility] S/P ADOLFO to RCA. He went to urgent care and was found to be in VT, EMS called, amiodarone given in route.  IV lidocaine given, 120 J synch biphasic cardioversion resulted in restoration of sinus rhythm by EP and admitted to CCU for further management.    #Monomorphic VT s/p CV  #CAD. PCI to RCA  #Dyslipidemia  #Pre-Diabetic - a1c 6.2  #Transaminitis - monitor    Monitor on tele  Plan for [facility] today in the evening   Continue Toprol for now, consideration for sotalol pending ischemic eval  Continue aspirin, plavix, statin  Awaiting echocardiogram  Will follow

## 2024-06-06 NOTE — PROGRESS NOTE ADULT - SUBJECTIVE AND OBJECTIVE BOX
CHIEF COMPLAINT: Patient is a 53y old  Male who presents with a chief complaint of     FROM H&P: 52 y/o male with a PMHx of seasonal allergies, ventral hernia presents to the ED BIBA from urgent care for Vtach. Pt was seen in ED on 5/21, EKG showing +STEMI and pt went to cath lab. Pt s/p LHC S/P ADOLFO to RCA on 81mg ASA and Plavix. Pt reports palpitations since 7am. Pt went to urgent care, EKG showing Vtach and pt brought to ED for evaluation. EMS administered Amiodarone 150mg. Denies CP, SOB, syncope. No EtOH use. No drug use. No other complaints at this time. Cardio: Dr. Dueñas.  IV lidocaine given, 120 J synch biphasic cardioversion resulted in restoration of sinus rhythm by EP.    6/5. Doing ok, a little tired.   No CP or SOB at the moment. SR    6/6.    MEDICATIONS  (STANDING):  aspirin enteric coated 81 milliGRAM(s) Oral daily  atorvastatin 80 milliGRAM(s) Oral at bedtime  chlorhexidine 4% Liquid 1 Application(s) Topical daily  clopidogrel Tablet 75 milliGRAM(s) Oral daily  dextrose 10% Bolus 125 milliLiter(s) IV Bolus once  dextrose 5%. 1000 milliLiter(s) (100 mL/Hr) IV Continuous <Continuous>  dextrose 5%. 1000 milliLiter(s) (50 mL/Hr) IV Continuous <Continuous>  dextrose 50% Injectable 25 Gram(s) IV Push once  dextrose 50% Injectable 12.5 Gram(s) IV Push once  glucagon  Injectable 1 milliGRAM(s) IntraMuscular once  insulin lispro (ADMELOG) corrective regimen sliding scale   SubCutaneous at bedtime  insulin lispro (ADMELOG) corrective regimen sliding scale   SubCutaneous three times a day before meals  magnesium sulfate  IVPB 1 Gram(s) IV Intermittent once  metoprolol succinate ER 50 milliGRAM(s) Oral daily  pantoprazole    Tablet 40 milliGRAM(s) Oral before breakfast    MEDICATIONS  (PRN):  dextrose Oral Gel 15 Gram(s) Oral once PRN Blood Glucose LESS THAN 70 milliGRAM(s)/deciliter    PHYSICAL EXAM:  T(C): 37.1 (06 Jun 2024 07:18), Max: 37.4 (05 Jun 2024 19:09)  T(F): 98.7 (06 Jun 2024 07:18), Max: 99.3 (05 Jun 2024 19:09)  HR: 89 (06 Jun 2024 07:00) (87 - 168)  BP: 119/86 (06 Jun 2024 07:00) (104/79 - 143/76)  BP(mean): 95 (06 Jun 2024 07:00) (84 - 105)  RR: 26 (06 Jun 2024 07:00) (0 - 35)  SpO2: 95% (06 Jun 2024 07:00) (89% - 99%)    Parameters below as of 06 Jun 2024 07:00  Patient On (Oxygen Delivery Method): nasal cannula  O2 Flow (L/min): 2    Constitutional: NAD, awake and alert  HEENT: PERR, EOMI, Normal Hearing, MMM  Neck: Soft and supple, No LAD, No JVD  Respiratory: Breath sounds are clear bilaterally, No wheezing, rales or rhonchi  Cardiovascular: S1 and S2, regular rate and rhythm, no Murmurs, gallops or rubs  Gastrointestinal: Bowel Sounds present, soft, nontender, nondistended, no guarding, no rebound  Extremities: No peripheral edema  Vascular: 2+ peripheral pulses  Neurological: A/O x 3, no focal deficits  Musculoskeletal: 5/5 strength b/l upper and lower extremities  Skin: No rashes    =======================================    INTERPRETATION OF TELEMETRY: SR    ========================================    LABS: All Labs Reviewed:                        14.5   13.33 )-----------( 233      ( 06 Jun 2024 05:29 )             42.3     06-06    138  |  109<H>  |  13  ----------------------------<  117<H>  3.9   |  25  |  0.78    Ca    8.7      06 Jun 2024 05:29  Phos  2.7     06-06  Mg     1.9     06-06    TPro  6.4  /  Alb  3.2<L>  /  TBili  0.9  /  DBili  x   /  AST  43<H>  /  ALT  98<H>  /  AlkPhos  54  06-06    PT/INR - ( 05 Jun 2024 11:54 )   PT: 12.4 sec;   INR: 1.10 ratio         PTT - ( 05 Jun 2024 11:54 )  PTT:31.3 sec  CARDIAC MARKERS ( 06 Jun 2024 05:29 )  x     / x     / 139 U/L / x     / x      CARDIAC MARKERS ( 05 Jun 2024 20:07 )  x     / x     / 183 U/L / x     / x        Troponin: 76.30 ng/L, Troponin: 117.02 ng/L, Troponin: 96.19 ng/L    CARDIAC TESTING:    < from: TTE W or WO Ultrasound Enhancing Agent (05.21.24 @ 15:30) >  1. Left ventricular systolic function is normal with an ejection fraction of 55 % by Kennedy's method of disks.   2. Normal right ventricular cavity size and normal systolic function.   3. Mild mitral regurgitation.   4. Mild to moderate aortic regurgitation.   5. Mild to moderate left ventricular hypertrophy.   6. Thereis akinesis of the inferior wall but overall preserved LV function.      < from: Cardiac Catheterization (05.21.24 @ 10:51) >  Conclusions: There is significant triple vessel coronary artery disease. A successful intervention of the RCA was performed. RPDA appears a  and fills retrograde.   Recommendations: Aggressive risk factor modification with diet, exercise, and a goal LDL of less than 70.; Dual antiplatelet therapy should be  continued for a minimum of 6 months     RADIOLOGY & ADDITIONAL STUDIES:     CHIEF COMPLAINT: Patient is a 53y old  Male who presents with a chief complaint of     FROM H&P: 54 y/o male with a PMHx of seasonal allergies, ventral hernia presents to the ED BIBA from urgent care for Vtach. Pt was seen in ED on 5/21, EKG showing +STEMI and pt went to cath lab. Pt s/p LHC S/P ADOLFO to RCA on 81mg ASA and Plavix. Pt reports palpitations since 7am. Pt went to urgent care, EKG showing Vtach and pt brought to ED for evaluation. EMS administered Amiodarone 150mg. Denies CP, SOB, syncope. No EtOH use. No drug use. No other complaints at this time. Cardio: Dr. Dueñas.  IV lidocaine given, 120 J synch biphasic cardioversion resulted in restoration of sinus rhythm by EP.    6/5. Doing ok, a little tired.   No CP or SOB at the moment. SR    6/6. no overnight events    MEDICATIONS  (STANDING):  aspirin enteric coated 81 milliGRAM(s) Oral daily  atorvastatin 80 milliGRAM(s) Oral at bedtime  chlorhexidine 4% Liquid 1 Application(s) Topical daily  clopidogrel Tablet 75 milliGRAM(s) Oral daily  dextrose 10% Bolus 125 milliLiter(s) IV Bolus once  dextrose 5%. 1000 milliLiter(s) (100 mL/Hr) IV Continuous <Continuous>  dextrose 5%. 1000 milliLiter(s) (50 mL/Hr) IV Continuous <Continuous>  dextrose 50% Injectable 25 Gram(s) IV Push once  dextrose 50% Injectable 12.5 Gram(s) IV Push once  glucagon  Injectable 1 milliGRAM(s) IntraMuscular once  insulin lispro (ADMELOG) corrective regimen sliding scale   SubCutaneous at bedtime  insulin lispro (ADMELOG) corrective regimen sliding scale   SubCutaneous three times a day before meals  magnesium sulfate  IVPB 1 Gram(s) IV Intermittent once  metoprolol succinate ER 50 milliGRAM(s) Oral daily  pantoprazole    Tablet 40 milliGRAM(s) Oral before breakfast    MEDICATIONS  (PRN):  dextrose Oral Gel 15 Gram(s) Oral once PRN Blood Glucose LESS THAN 70 milliGRAM(s)/deciliter    PHYSICAL EXAM:  T(C): 37.1 (06 Jun 2024 07:18), Max: 37.4 (05 Jun 2024 19:09)  T(F): 98.7 (06 Jun 2024 07:18), Max: 99.3 (05 Jun 2024 19:09)  HR: 89 (06 Jun 2024 07:00) (87 - 168)  BP: 119/86 (06 Jun 2024 07:00) (104/79 - 143/76)  BP(mean): 95 (06 Jun 2024 07:00) (84 - 105)  RR: 26 (06 Jun 2024 07:00) (0 - 35)  SpO2: 95% (06 Jun 2024 07:00) (89% - 99%)    Parameters below as of 06 Jun 2024 07:00  Patient On (Oxygen Delivery Method): nasal cannula  O2 Flow (L/min): 2    Constitutional: NAD, awake and alert  HEENT: PERR, EOMI, Normal Hearing, MMM  Neck: Soft and supple, No LAD, No JVD  Respiratory: Breath sounds are clear bilaterally, No wheezing, rales or rhonchi  Cardiovascular: S1 and S2, regular rate and rhythm, no Murmurs, gallops or rubs  Gastrointestinal: Bowel Sounds present, soft, nontender, nondistended, no guarding, no rebound  Extremities: No peripheral edema  Vascular: 2+ peripheral pulses  Neurological: A/O x 3, no focal deficits  Musculoskeletal: 5/5 strength b/l upper and lower extremities  Skin: No rashes    =======================================    INTERPRETATION OF TELEMETRY: SR    ========================================    LABS: All Labs Reviewed:                        14.5   13.33 )-----------( 233      ( 06 Jun 2024 05:29 )             42.3     06-06    138  |  109<H>  |  13  ----------------------------<  117<H>  3.9   |  25  |  0.78    Ca    8.7      06 Jun 2024 05:29  Phos  2.7     06-06  Mg     1.9     06-06    TPro  6.4  /  Alb  3.2<L>  /  TBili  0.9  /  DBili  x   /  AST  43<H>  /  ALT  98<H>  /  AlkPhos  54  06-06    PT/INR - ( 05 Jun 2024 11:54 )   PT: 12.4 sec;   INR: 1.10 ratio         PTT - ( 05 Jun 2024 11:54 )  PTT:31.3 sec  CARDIAC MARKERS ( 06 Jun 2024 05:29 )  x     / x     / 139 U/L / x     / x      CARDIAC MARKERS ( 05 Jun 2024 20:07 )  x     / x     / 183 U/L / x     / x        Troponin: 76.30 ng/L, Troponin: 117.02 ng/L, Troponin: 96.19 ng/L    CARDIAC TESTING:    < from: TTE W or WO Ultrasound Enhancing Agent (05.21.24 @ 15:30) >  1. Left ventricular systolic function is normal with an ejection fraction of 55 % by Kennedy's method of disks.   2. Normal right ventricular cavity size and normal systolic function.   3. Mild mitral regurgitation.   4. Mild to moderate aortic regurgitation.   5. Mild to moderate left ventricular hypertrophy.   6. Thereis akinesis of the inferior wall but overall preserved LV function.      < from: Cardiac Catheterization (05.21.24 @ 10:51) >  Conclusions: There is significant triple vessel coronary artery disease. A successful intervention of the RCA was performed. RPDA appears a  and fills retrograde.   Recommendations: Aggressive risk factor modification with diet, exercise, and a goal LDL of less than 70.; Dual antiplatelet therapy should be  continued for a minimum of 6 months     RADIOLOGY & ADDITIONAL STUDIES:

## 2024-06-06 NOTE — BRIEF OPERATIVE NOTE - OPERATION/FINDINGS
patent RCA stent,  of RPDA with collaterals (no changes from prior angiogram on 5/21/24),LVEDP 6 mmHg,

## 2024-06-06 NOTE — PROGRESS NOTE ADULT - SUBJECTIVE AND OBJECTIVE BOX
HPI:  54 yo male, recently admitted to  following inferior STEMI 5/21/24, s/p 1 ADOLFO to mRCA with pEF, who now presented with intermittent episodes of palpitations, diaphoresis, and shortness of breath over the past 2-3 days with dyspnea on exertion found to be in sustained ventricular tachycardia at Urgent [facility], prompting transfer to  ED. Failed on Amiodarone 150mg IVPB, Lidocaine, adenosine to convert VT, s/p DCCV 250J with successful conversion to NSR. Initial troponin negative.      Pt brought to cath lab for ischemic evaluation.   Pt underwent LHC via Rt radial artery access, revealed patent RCA stent,  of RPDA with collaterals which has no changes from prior. LVEDP 6 mmHg     ROS: denies chest pain/ pressure, SOB or palpitation     Vital Signs;  T(C): 36.7 (06-06-24 @ 15:52), Max: 37.4 (06-05-24 @ 19:09)  HR: 80 (06-06-24 @ 16:00) (80 - 97)  BP: 120/83 (06-06-24 @ 16:00) (110/71 - 143/76)  RR: 28 (06-06-24 @ 16:00) (22 - 35)  SpO2: 96% (06-06-24 @ 16:00) (89% - 97%)    PHYSICAL EXAM:  GENERAL: NAD, well-groomed, well-developed  HEENT - NC/AT, pupils equal and reactive to light,  ; Moist mucous membranes, Good dentition, No lesions  NECK: Supple, No JVD  CHEST/LUNG: Clear to auscultation bilaterally; No rales, rhonchi, wheezing  HEART: Regular rate and rhythm; No murmurs, rubs, or gallops  ABDOMEN: Soft, Nontender, Nondistended; Bowel sounds present  EXTREMITIES:  2+ Peripheral Pulses, No clubbing, cyanosis, or edema  NEURO:  No Focal deficits, sensory and motor intact  SKIN: No rashes or lesions  Access site: Rt. Radial artery access with radial band (to be removed in 1 hr post cath): no hematoma or bleeding     LABS: All Labs Reviewed:                        14.5   13.33 )-----------( 233      ( 06 Jun 2024 05:29 )             42.3     06-06    138  |  109<H>  |  13  ----------------------------<  117<H>  3.9   |  25  |  0.78    Ca    8.7      06 Jun 2024 05:29  Phos  2.7     06-06  Mg     1.9     06-06    TPro  6.4  /  Alb  3.2<L>  /  TBili  0.9  /  DBili  x   /  AST  43<H>  /  ALT  98<H>  /  AlkPhos  54  06-06  PT/INR - ( 05 Jun 2024 11:54 )   PT: 12.4 sec;   INR: 1.10 ratio    PTT - ( 05 Jun 2024 11:54 )  PTT:31.3 sec  CARDIAC MARKERS ( 06 Jun 2024 05:29 )  x     / x     / 139 U/L / x     / x      CARDIAC MARKERS ( 05 Jun 2024 20:07 )  x     / x     / 183 U/L / x     / x        Echo < from: TTE Limited W or WO Ultrasound Enhancing Agent (06.05.24 @ 22:25) >  CONCLUSIONS:   1. Left ventricular systolic function is normal with an ejection fraction of 55 % by Kennedy's method of disks.   2. The basal inferior wall appears hypokinetic.   3. Structurally normal pulmonic valve with normal leaflet excursion.   4. No significant valvular disease.  < end of copied text >    Cath Report < from: Cardiac Catheterization (05.21.24 @ 10:51) >  Conclusions:   There is significant triple vessel coronary artery disease.A  successful intervention of the RCA was performed.RPDA appears a   and fills retrograde.   Recommendations:   Aggressive risk factor modification with diet, exercise, and a goal  LDL of less than 70.; Dual antiplatelet therapy should be  continued for a minimum of 6 months   < end of copied text >    Medications:  aspirin enteric coated 81 milliGRAM(s) Oral daily  atorvastatin 80 milliGRAM(s) Oral at bedtime  chlorhexidine 4% Liquid 1 Application(s) Topical daily  clopidogrel Tablet 75 milliGRAM(s) Oral daily  dextrose 10% Bolus 125 milliLiter(s) IV Bolus once  dextrose 5%. 1000 milliLiter(s) IV Continuous <Continuous>  dextrose 5%. 1000 milliLiter(s) IV Continuous <Continuous>  dextrose 50% Injectable 25 Gram(s) IV Push once  dextrose 50% Injectable 12.5 Gram(s) IV Push once  dextrose Oral Gel 15 Gram(s) Oral once PRN  glucagon  Injectable 1 milliGRAM(s) IntraMuscular once  insulin lispro (ADMELOG) corrective regimen sliding scale   SubCutaneous three times a day before meals  insulin lispro (ADMELOG) corrective regimen sliding scale   SubCutaneous at bedtime  pantoprazole    Tablet 40 milliGRAM(s) Oral before breakfast  sodium chloride 0.9%. 1000 milliLiter(s) IV Continuous <Continuous>    A/P:  54 yo male, recently admitted to  following inferior STEMI 5/21/24, s/p 1 ADOLFO to mRCA with pEF, who now presented with intermittent episodes of palpitations, diaphoresis, and shortness of breath over the past 2-3 days with dyspnea on exertion found to be in sustained ventricular tachycardia at Urgent Center, prompting transfer to  ED. Failed on Amiodarone 150mg IVPB, Lidocaine, adenosine to convert VT, s/p DCCV 250J with successful conversion to NSR. Initial troponin negative.    Pt underwent LHC via Rt radial artery access, revealed patent RCA stent,  of RPDA with collaterals which has no changes from prior. LVEDP 6 mmHg.   - tele monitor   - post IV hydration: NS at 200 cc/hr x2 hrs   - continue ASA 81 mg and Plavix 75 mg daily   - switched Metoprolol to Sotalol 80 mg BID by EP   - continue high intensity statin  - Plan for ICD implant on Monday, as per EP   - post procedure, outcome and follow up care reviewed with patient and DR. Dueañs   - Discussed therapeutic lifestyle modifications to reduce CAD risk factors including cardiac diet, weight control, exercise, smoking cessation, medication compliance and regular outpt follow-up.   - continue rest of care by Primary team     Discussed the plan with Dr. Dueñas, KRYSTINA Lopez, EP NP and Pt.

## 2024-06-06 NOTE — CONSULT NOTE ADULT - NS ATTEND AMEND GEN_ALL_CORE FT
This is a 53 year old man s/p Inferior wall MI last month who presented with sustained monomorphic ventricular tachycardia. initial ECG demonstrated wide complex tachycardia with p waves visible in tail of QRS. Adenosine resulted in AV block and loss ofp waves without termination of tachycardia. lidocaine 90 mg resulted in slowing of VT from 168 bpm to 150 bpm without termination.  Cardioversion performed.       Will obtain a cardiac cath today and likely start Sotalol ( will decide after cath) given heart failure and need for cardioversion with sustained VT will consider secondary prevention.

## 2024-06-06 NOTE — PROGRESS NOTE ADULT - SUBJECTIVE AND OBJECTIVE BOX
ICU  Note    HPI:    S:    Pt seen and examined  HD #  53yMale  Pt here for        Allergies    No Known Allergies    Intolerances        MEDICATIONS  (STANDING):  aspirin enteric coated 81 milliGRAM(s) Oral daily  atorvastatin 80 milliGRAM(s) Oral at bedtime  chlorhexidine 4% Liquid 1 Application(s) Topical daily  clopidogrel Tablet 75 milliGRAM(s) Oral daily  dextrose 10% Bolus 125 milliLiter(s) IV Bolus once  dextrose 5%. 1000 milliLiter(s) (50 mL/Hr) IV Continuous <Continuous>  dextrose 5%. 1000 milliLiter(s) (100 mL/Hr) IV Continuous <Continuous>  dextrose 50% Injectable 25 Gram(s) IV Push once  dextrose 50% Injectable 12.5 Gram(s) IV Push once  glucagon  Injectable 1 milliGRAM(s) IntraMuscular once  insulin lispro (ADMELOG) corrective regimen sliding scale   SubCutaneous at bedtime  insulin lispro (ADMELOG) corrective regimen sliding scale   SubCutaneous three times a day before meals  metoprolol succinate ER 50 milliGRAM(s) Oral daily  pantoprazole    Tablet 40 milliGRAM(s) Oral before breakfast    MEDICATIONS  (PRN):  dextrose Oral Gel 15 Gram(s) Oral once PRN Blood Glucose LESS THAN 70 milliGRAM(s)/deciliter      Drug Dosing Weight  Height (cm): 177.8 (05 Jun 2024 11:51)  Weight (kg): 66.8 (05 Jun 2024 11:51)  BMI (kg/m2): 21.1 (05 Jun 2024 11:51)  BSA (m2): 1.83 (05 Jun 2024 11:51)    CENTRAL LINE: [ ] YES [ ] NO  LOCATION:   DATE INSERTED:  REMOVE: [ ] YES [ ] NO  EXPLAIN:    OLEA: [ ] YES [ ] NO    DATE INSERTED:  REMOVE: [ ] YES [ ] NO  EXPLAIN:    A-LINE: [ ] YES [ ] NO  LOCATION:   DATE INSERTED:  REMOVE: [ ] YES [ ] NO  EXPLAIN:    PAST MEDICAL & SURGICAL HISTORY:  Seasonal allergies      Ventral hernia without obstruction or gangrene      ST elevation myocardial infarction (STEMI)      Coronary artery disease      Deviated nasal septum  h/o          FAMILY HISTORY:  No pertinent family history in first degree relatives          REVIEW OF SYSTEMS    UNLESS OTHERWISE NOTED IN HPI above:    Constitutional:  No Weight Change, No Fever, No Chills, No Night Sweats, No Fatigue, No Malaise  ENT/Mouth:  No Hearing Changes, No Ear Pain, No Nasal Congestion, No  Sinus Pain, No Hoarseness, No sore throat, No Rhinorrhea, No Swallowing  Difficulty  Eyes:  No Eye Pain, No Swelling, No Redness, No Foreign Body, No Discharge, No Vision Changes  Cardiovascular:  No Chest Pain, No SOB, No PND, No Dyspnea on Exertion,  No Orthopnea, No Claudication, No Edema, No Palpitations  Respiratory:  No Cough, No Sputum, No Wheezing, No Smoke Exposure, No Dyspnea  Gastrointestinal:  No Nausea, No Vomiting, No Diarrhea, No  Constipation, No Pain, No Heartburn, No Anorexia, No Dysphagia, No  Hematochezia, No Melena, No Flatulence, No Jaundice  Genitourinary:  No Dysmenorrhea, No DUB, No Dyspareunia, No Dysuria, No  Urinary Frequency, No Hematuria, No Urinary Incontinence, No Urgency,  No Flank Pain, No Urinary Flow Changes, No Hesitancy  Musculoskeletal:  No Arthralgias, No Myalgias, No Joint Swelling, No  Joint Stiffness, No Back Pain, No Neck Pain, No Injury History  Skin:  No Skin Lesions, No Pruritis, No Hair Changes, No Breast/Skin Changes, No Nipple Discharge  Neuro:  No Weakness, No Numbness, No Paresthesias, No Loss of  Consciousness, No Syncope, No Dizziness, No Headache, No Coordination  Changes, No Recent Falls  Psych:  No Anxiety/Panic, No Depression, No Insomnia, No Personality  Changes, No Delusions, No Rumination, No SI/HI/AH/VH, No Social Issues,  No Memory Changes, No Violence/Abuse Hx., No Eating Concerns  Heme/Lymph:  No Bruising, No Bleeding, No Transfusions History, No Lymphadenopathy  Endocrine:  No Polyuria, No Polydipsia, No Temperature Intolerance    O:    ICU Vital Signs Last 24 Hrs  T(C): 36.7 (06 Jun 2024 15:52), Max: 37.4 (05 Jun 2024 19:09)  T(F): 98.1 (06 Jun 2024 15:52), Max: 99.3 (05 Jun 2024 19:09)  HR: 80 (06 Jun 2024 16:00) (80 - 97)  BP: 120/83 (06 Jun 2024 16:00) (110/71 - 143/76)  BP(mean): 96 (06 Jun 2024 16:00) (84 - 109)  ABP: --  ABP(mean): --  RR: 28 (06 Jun 2024 16:00) (22 - 35)  SpO2: 96% (06 Jun 2024 16:00) (89% - 97%)    O2 Parameters below as of 06 Jun 2024 16:00  Patient On (Oxygen Delivery Method): nasal cannula  O2 Flow (L/min): 2              I&O's Detail    05 Jun 2024 07:01  -  06 Jun 2024 07:00  --------------------------------------------------------  IN:    Oral Fluid: 300 mL  Total IN: 300 mL    OUT:    Voided (mL): 1100 mL  Total OUT: 1100 mL    Total NET: -800 mL      06 Jun 2024 07:01  -  06 Jun 2024 17:38  --------------------------------------------------------  IN:    Sodium Chloride 0.9% Bolus: 250 mL  Total IN: 250 mL    OUT:    Voided (mL): 2100 mL  Total OUT: 2100 mL    Total NET: -1850 mL              PE:    Adult lying in bed  No JVD trachea midline  Normocephalic, atraumatic  S1S2+  CTA B/L  Abd soft NTND  No leg swelling/edema noted  Awake and alert  Skin pink, warm    LABS:    CBC Full  -  ( 06 Jun 2024 05:29 )  WBC Count : 13.33 K/uL  RBC Count : 4.52 M/uL  Hemoglobin : 14.5 g/dL  Hematocrit : 42.3 %  Platelet Count - Automated : 233 K/uL  Mean Cell Volume : 93.6 fl  Mean Cell Hemoglobin : 32.1 pg  Mean Cell Hemoglobin Concentration : 34.3 gm/dL  Auto Neutrophil # : 10.67 K/uL  Auto Lymphocyte # : 1.37 K/uL  Auto Monocyte # : 1.14 K/uL  Auto Eosinophil # : 0.04 K/uL  Auto Basophil # : 0.05 K/uL  Auto Neutrophil % : 79.9 %  Auto Lymphocyte % : 10.3 %  Auto Monocyte % : 8.6 %  Auto Eosinophil % : 0.3 %  Auto Basophil % : 0.4 %    06-06    138  |  109<H>  |  13  ----------------------------<  117<H>  3.9   |  25  |  0.78    Ca    8.7      06 Jun 2024 05:29  Phos  2.7     06-06  Mg     1.9     06-06    TPro  6.4  /  Alb  3.2<L>  /  TBili  0.9  /  DBili  x   /  AST  43<H>  /  ALT  98<H>  /  AlkPhos  54  06-06    PT/INR - ( 05 Jun 2024 11:54 )   PT: 12.4 sec;   INR: 1.10 ratio         PTT - ( 05 Jun 2024 11:54 )  PTT:31.3 sec  Urinalysis Basic - ( 06 Jun 2024 05:29 )    Color: x / Appearance: x / SG: x / pH: x  Gluc: 117 mg/dL / Ketone: x  / Bili: x / Urobili: x   Blood: x / Protein: x / Nitrite: x   Leuk Esterase: x / RBC: x / WBC x   Sq Epi: x / Non Sq Epi: x / Bacteria: x      CAPILLARY BLOOD GLUCOSE      POCT Blood Glucose.: 112 mg/dL (06 Jun 2024 11:30)  POCT Blood Glucose.: 126 mg/dL (06 Jun 2024 08:12)  POCT Blood Glucose.: 92 mg/dL (05 Jun 2024 21:23)  POCT Blood Glucose.: 78 mg/dL (05 Jun 2024 19:12)    CARDIAC MARKERS ( 06 Jun 2024 05:29 )  x     / x     / 139 U/L / x     / x      CARDIAC MARKERS ( 05 Jun 2024 20:07 )  x     / x     / 183 U/L / x     / x          LIVER FUNCTIONS - ( 06 Jun 2024 05:29 )  Alb: 3.2 g/dL / Pro: 6.4 gm/dL / ALK PHOS: 54 U/L / ALT: 98 U/L / AST: 43 U/L / GGT: x

## 2024-06-07 ENCOUNTER — TRANSCRIPTION ENCOUNTER (OUTPATIENT)
Age: 54
End: 2024-06-07

## 2024-06-07 LAB
ADD ON TEST-SPECIMEN IN LAB: SIGNIFICANT CHANGE UP
ANION GAP SERPL CALC-SCNC: 3 MMOL/L — LOW (ref 5–17)
BUN SERPL-MCNC: 15 MG/DL — SIGNIFICANT CHANGE UP (ref 7–23)
CALCIUM SERPL-MCNC: 8.7 MG/DL — SIGNIFICANT CHANGE UP (ref 8.5–10.1)
CHLORIDE SERPL-SCNC: 111 MMOL/L — HIGH (ref 96–108)
CO2 SERPL-SCNC: 26 MMOL/L — SIGNIFICANT CHANGE UP (ref 22–31)
CREAT SERPL-MCNC: 0.68 MG/DL — SIGNIFICANT CHANGE UP (ref 0.5–1.3)
EGFR: 113 ML/MIN/1.73M2 — SIGNIFICANT CHANGE UP
GLUCOSE BLDC GLUCOMTR-MCNC: 105 MG/DL — HIGH (ref 70–99)
GLUCOSE SERPL-MCNC: 113 MG/DL — HIGH (ref 70–99)
MAGNESIUM SERPL-MCNC: 2.2 MG/DL — SIGNIFICANT CHANGE UP (ref 1.6–2.6)
PHOSPHATE SERPL-MCNC: 3.8 MG/DL — SIGNIFICANT CHANGE UP (ref 2.5–4.5)
POTASSIUM SERPL-MCNC: 4.1 MMOL/L — SIGNIFICANT CHANGE UP (ref 3.5–5.3)
POTASSIUM SERPL-SCNC: 4.1 MMOL/L — SIGNIFICANT CHANGE UP (ref 3.5–5.3)
SODIUM SERPL-SCNC: 140 MMOL/L — SIGNIFICANT CHANGE UP (ref 135–145)

## 2024-06-07 PROCEDURE — 99233 SBSQ HOSP IP/OBS HIGH 50: CPT

## 2024-06-07 PROCEDURE — 93010 ELECTROCARDIOGRAM REPORT: CPT

## 2024-06-07 PROCEDURE — 99232 SBSQ HOSP IP/OBS MODERATE 35: CPT

## 2024-06-07 RX ORDER — ONDANSETRON 8 MG/1
4 TABLET, FILM COATED ORAL EVERY 8 HOURS
Refills: 0 | Status: DISCONTINUED | OUTPATIENT
Start: 2024-06-07 | End: 2024-06-11

## 2024-06-07 RX ORDER — ACETAMINOPHEN 500 MG
650 TABLET ORAL EVERY 6 HOURS
Refills: 0 | Status: DISCONTINUED | OUTPATIENT
Start: 2024-06-07 | End: 2024-06-11

## 2024-06-07 RX ORDER — ALPRAZOLAM 0.25 MG
0.25 TABLET ORAL ONCE
Refills: 0 | Status: DISCONTINUED | OUTPATIENT
Start: 2024-06-07 | End: 2024-06-07

## 2024-06-07 RX ORDER — ENOXAPARIN SODIUM 100 MG/ML
40 INJECTION SUBCUTANEOUS EVERY 24 HOURS
Refills: 0 | Status: DISCONTINUED | OUTPATIENT
Start: 2024-06-07 | End: 2024-06-09

## 2024-06-07 RX ADMIN — CHLORHEXIDINE GLUCONATE 1 APPLICATION(S): 213 SOLUTION TOPICAL at 16:14

## 2024-06-07 RX ADMIN — Medication 80 MILLIGRAM(S): at 18:25

## 2024-06-07 RX ADMIN — Medication 81 MILLIGRAM(S): at 10:50

## 2024-06-07 RX ADMIN — PANTOPRAZOLE SODIUM 40 MILLIGRAM(S): 20 TABLET, DELAYED RELEASE ORAL at 06:37

## 2024-06-07 RX ADMIN — Medication 80 MILLIGRAM(S): at 06:37

## 2024-06-07 RX ADMIN — ENOXAPARIN SODIUM 40 MILLIGRAM(S): 100 INJECTION SUBCUTANEOUS at 18:25

## 2024-06-07 RX ADMIN — ATORVASTATIN CALCIUM 80 MILLIGRAM(S): 80 TABLET, FILM COATED ORAL at 21:54

## 2024-06-07 RX ADMIN — Medication 0.25 MILLIGRAM(S): at 21:54

## 2024-06-07 RX ADMIN — CLOPIDOGREL BISULFATE 75 MILLIGRAM(S): 75 TABLET, FILM COATED ORAL at 10:50

## 2024-06-07 NOTE — PROGRESS NOTE ADULT - ASSESSMENT
54 y/o male with a PMHx of seasonal allergies, ventral hernia, 5/21 +STEMI s/p LHC S/P ADOLFO to RCA. He went to urgent care and was found to be in VT, EMS called, amiodarone given in route.  IV lidocaine given, 120 J synch biphasic cardioversion resulted in restoration of sinus rhythm by EP and admitted to CCU for further management.    #Monomorphic VT s/p CV  #Recent STEMI. s/p PCI to RCA  #Dyslipidemia  #Pre-Diabetic - a1c 6.2  #Transaminitis - monitor    Monitor on tele  6/6/24: LHC: revealed patent RCA stent,  of RPDA with collaterals which has no changes from prior.   Continue Toprol   Sotalol loading initiated on 6/6, monitor QTC K4, Mg2  Continue aspirin, plavix, statin  Echocardiogram reviewed, LVEF 55%  EP to follow up regarding need for ICD.    Will follow

## 2024-06-07 NOTE — PROGRESS NOTE ADULT - SUBJECTIVE AND OBJECTIVE BOX
Patient is a 53y old  Male who presents with a chief complaint of Sustained ventricular tachycardia (07 Jun 2024 10:05)      Allergies    No Known Allergies    Intolerances      REVIEW OF SYSTEMS: SEE BELOW       ICU Vital Signs Last 24 Hrs  T(C): 36.4 (07 Jun 2024 10:00), Max: 36.9 (07 Jun 2024 00:00)  T(F): 97.6 (07 Jun 2024 10:00), Max: 98.5 (07 Jun 2024 00:00)  HR: 78 (07 Jun 2024 13:00) (66 - 98)  BP: 138/90 (07 Jun 2024 13:00) (92/74 - 146/97)  BP(mean): 106 (07 Jun 2024 13:00) (79 - 119)  ABP: --  ABP(mean): --  RR: 26 (07 Jun 2024 13:00) (15 - 29)  SpO2: 94% (07 Jun 2024 13:00) (92% - 96%)    O2 Parameters below as of 07 Jun 2024 07:00  Patient On (Oxygen Delivery Method): room air            CAPILLARY BLOOD GLUCOSE      POCT Blood Glucose.: 105 mg/dL (07 Jun 2024 08:00)  POCT Blood Glucose.: 185 mg/dL (06 Jun 2024 21:30)      I&O's Summary    06 Jun 2024 07:01  -  07 Jun 2024 07:00  --------------------------------------------------------  IN: 250 mL / OUT: 2500 mL / NET: -2250 mL    07 Jun 2024 07:01  -  07 Jun 2024 14:29  --------------------------------------------------------  IN: 360 mL / OUT: 700 mL / NET: -340 mL            MEDICATIONS  (STANDING):  aspirin enteric coated 81 milliGRAM(s) Oral daily  atorvastatin 80 milliGRAM(s) Oral at bedtime  chlorhexidine 4% Liquid 1 Application(s) Topical daily  clopidogrel Tablet 75 milliGRAM(s) Oral daily  enoxaparin Injectable 40 milliGRAM(s) SubCutaneous every 24 hours  sotalol. 80 milliGRAM(s) Oral every 12 hours      MEDICATIONS  (PRN):  acetaminophen     Tablet .. 650 milliGRAM(s) Oral every 6 hours PRN Temp greater or equal to 38C (100.4F), Mild Pain (1 - 3)  aluminum hydroxide/magnesium hydroxide/simethicone Suspension 30 milliLiter(s) Oral every 4 hours PRN Dyspepsia  ondansetron Injectable 4 milliGRAM(s) IV Push every 8 hours PRN Nausea and/or Vomiting      PHYSICAL EXAM: SEE BELOW                          14.5   13.33 )-----------( 233      ( 06 Jun 2024 05:29 )             42.3       06-07    140  |  111<H>  |  15  ----------------------------<  113<H>  4.1   |  26  |  0.68    Ca    8.7      07 Jun 2024 05:51  Phos  3.8     06-07  Mg     2.2     06-07    TPro  6.4  /  Alb  3.2<L>  /  TBili  0.9  /  DBili  x   /  AST  43<H>  /  ALT  98<H>  /  AlkPhos  54  06-06      CARDIAC MARKERS ( 06 Jun 2024 05:29 )  x     / x     / 139 U/L / x     / x      CARDIAC MARKERS ( 05 Jun 2024 20:07 )  x     / x     / 183 U/L / x     / x            Urinalysis Basic - ( 07 Jun 2024 05:51 )    Color: x / Appearance: x / SG: x / pH: x  Gluc: 113 mg/dL / Ketone: x  / Bili: x / Urobili: x   Blood: x / Protein: x / Nitrite: x   Leuk Esterase: x / RBC: x / WBC x   Sq Epi: x / Non Sq Epi: x / Bacteria: x

## 2024-06-07 NOTE — PROGRESS NOTE ADULT - ASSESSMENT
54 yo M with above PMHx including CAD/STEMI, s/p PCI of RCA on 5/21, LVEF 55%  Presented with symptomatic sustained VT, s/p DCCV.  s/p LHC- patent stent in RCA  - metoprolol d/c'd, started sotalol 80mg po BID today  - 12 leads EKG in 2 hrs post each dose for QTC. call EP if QTC>500ms or >50ms from baseline.   - Avoid QT prolonging agents. keep K+>4, Mg>2. Pt needs 6 doses in-pt loading.  - Pt is a candidate for ICD implant for secondary prevention for SCD  - keep pt NPO post MN on 6/9 for ICD implant on 6/10  - r/b/i/a well discussed with pt, all questions answered  - continue ASA/plavix  - no anticoagulation on 6/7  Plan d/w pt//CCU team

## 2024-06-07 NOTE — DISCHARGE NOTE NURSING/CASE MANAGEMENT/SOCIAL WORK - PATIENT PORTAL LINK FT
You can access the FollowMyHealth Patient Portal offered by [facility] by registering at the following website: [email or web address]. By joining [facility]’s FollowMyHealth portal, you will also be able to view your health information using other applications (apps) compatible with our system.

## 2024-06-07 NOTE — DISCHARGE NOTE NURSING/CASE MANAGEMENT/SOCIAL WORK - NSDCFUADDAPPT_GEN_ALL_CORE_FT
Normal for race CARDIOLOGY FOLLOW UP APPOINTMENT: 6/14/24 @1PM at [facility] with SALVATORE Rdz

## 2024-06-07 NOTE — PROGRESS NOTE ADULT - SUBJECTIVE AND OBJECTIVE BOX
HPI:  54 y/o male with  PMHx of seasonal allergies, ventral hernia presents to the ED BIBA from urgent care for Vtach. Pt was seen in ED on 5/21, EKG showing +STEMI and pt went to cath lab. Pt s/p LHC S/P ADOLFO to RCA on 81mg ASA and Plavix. Pt reports palpitations since 7am on 6/5, Pt went to urgent care, EKG showing Vtach and pt brought to ED for evaluation. EMS administered Amiodarone 150mg. Denies CP, SOB, syncope. No EtOH use. No drug use. No other complaints at this time. Cardio: Dr. Dueñas.  IV lidocaine given, 120 J synch biphasic cardioversion resulted in restoration of sinus rhythm by EP.    Pt is resting in the bed, denies chest pain/SOB/palpitations  s/p LHC on 6/6:  revealed patent RCA stent,  of RPDA with collaterals which has no changes from prior.   echo (6/5/24) LVEF 55%  tele: SR 70-80's bpm occasional PVC  EKG post first dose of sotalol : SR 65 bpm, MI 136ms, QRS 82ms, QT 474ms, QTC 492ms      ROS: All other ROS is negative unless indicated above.      Physical Exam:  Vital Signs Last 24 Hrs  T(C): 36.5 (07 Jun 2024 06:00), Max: 36.9 (07 Jun 2024 00:00)  T(F): 97.7 (07 Jun 2024 06:00), Max: 98.5 (07 Jun 2024 00:00)  HR: 76 (07 Jun 2024 08:00) (71 - 98)  BP: 124/84 (07 Jun 2024 08:00) (92/74 - 146/97)  BP(mean): 95 (07 Jun 2024 08:00) (79 - 119)  RR: 29 (07 Jun 2024 08:00) (15 - 29)  SpO2: 94% (07 Jun 2024 08:00) (92% - 97%)    Parameters below as of 07 Jun 2024 07:00  Patient On (Oxygen Delivery Method): room air      Constitutional: well developed, no deformities and no acute distress    Neurological: Alert & Oriented x 3, GUAMAN, no focal deficits    HEENT: NC/AT, PERRLA, EOMI,  Neck supple.    Respiratory: CTA B/L, No wheezing/crackles/rhonchi    Cardiovascular: (+) S1 & S2, RRR, No m/r/g    Gastrointestinal: soft, NT, nondistended, (+) BS    Genitourinary: non distended bladder, voiding freely    Extremities: No pedal edema, No clubbing, No cyanosis    Skin:  normal skin color and pigmentation, no skin lesions            Allergies    No Known Allergies      MEDICATIONS  (STANDING):  aspirin enteric coated 81 milliGRAM(s) Oral daily  atorvastatin 80 milliGRAM(s) Oral at bedtime  chlorhexidine 4% Liquid 1 Application(s) Topical daily  clopidogrel Tablet 75 milliGRAM(s) Oral daily  dextrose 10% Bolus 125 milliLiter(s) IV Bolus once  dextrose 5%. 1000 milliLiter(s) (100 mL/Hr) IV Continuous <Continuous>  dextrose 5%. 1000 milliLiter(s) (50 mL/Hr) IV Continuous <Continuous>  dextrose 50% Injectable 25 Gram(s) IV Push once  dextrose 50% Injectable 12.5 Gram(s) IV Push once  glucagon  Injectable 1 milliGRAM(s) IntraMuscular once  insulin lispro (ADMELOG) corrective regimen sliding scale   SubCutaneous at bedtime  insulin lispro (ADMELOG) corrective regimen sliding scale   SubCutaneous three times a day before meals  pantoprazole    Tablet 40 milliGRAM(s) Oral before breakfast  sodium chloride 0.9%. 1000 milliLiter(s) (200 mL/Hr) IV Continuous <Continuous>  sotalol. 80 milliGRAM(s) Oral every 12 hours    MEDICATIONS  (PRN):  ALPRAZolam 0.25 milliGRAM(s) Oral at bedtime PRN anxiety  dextrose Oral Gel 15 Gram(s) Oral once PRN Blood Glucose LESS THAN 70 milliGRAM(s)/deciliter    LABS:                        14.5   13.33 )-----------( 233      ( 06 Jun 2024 05:29 )             42.3     06-07    140  |  111<H>  |  15  ----------------------------<  113<H>  4.1   |  26  |  0.68    Ca    8.7      07 Jun 2024 05:51  Phos  3.8     06-07  Mg     2.2     06-07    TPro  6.4  /  Alb  3.2<L>  /  TBili  0.9  /  DBili  x   /  AST  43<H>  /  ALT  98<H>  /  AlkPhos  54  06-06    PT/INR - ( 05 Jun 2024 11:54 )   PT: 12.4 sec;   INR: 1.10 ratio         PTT - ( 05 Jun 2024 11:54 )  PTT:31.3 sec

## 2024-06-07 NOTE — PROGRESS NOTE ADULT - SUBJECTIVE AND OBJECTIVE BOX
CHIEF COMPLAINT: Patient is a 53y old  Male who presents with a chief complaint of     FROM H&P: 52 y/o male with a PMHx of seasonal allergies, ventral hernia presents to the ED BIBA from urgent care for Vtach. Pt was seen in ED on 5/21, EKG showing +STEMI and pt went to cath lab. Pt s/p LHC S/P ADOLFO to RCA on 81mg ASA and Plavix. Pt reports palpitations since 7am. Pt went to urgent care, EKG showing Vtach and pt brought to ED for evaluation. EMS administered Amiodarone 150mg. Denies CP, SOB, syncope. No EtOH use. No drug use. No other complaints at this time. Cardio: Dr. Dueñas.  IV lidocaine given, 120 J synch biphasic cardioversion resulted in restoration of sinus rhythm by EP.    6/5. Doing ok, a little tired.   No CP or SOB at the moment. SR  6/6. no overnight events  6/7. S/p cath yesterday > revealed patent RCA stent,  of RPDA with collaterals which has no changes from prior. Feeling well, no complaints, no events on tele, qtc stable so far.    MEDICATIONS  (STANDING):  aspirin enteric coated 81 milliGRAM(s) Oral daily  atorvastatin 80 milliGRAM(s) Oral at bedtime  chlorhexidine 4% Liquid 1 Application(s) Topical daily  clopidogrel Tablet 75 milliGRAM(s) Oral daily  dextrose 10% Bolus 125 milliLiter(s) IV Bolus once  dextrose 5%. 1000 milliLiter(s) (50 mL/Hr) IV Continuous <Continuous>  dextrose 5%. 1000 milliLiter(s) (100 mL/Hr) IV Continuous <Continuous>  dextrose 50% Injectable 25 Gram(s) IV Push once  dextrose 50% Injectable 12.5 Gram(s) IV Push once  glucagon  Injectable 1 milliGRAM(s) IntraMuscular once  insulin lispro (ADMELOG) corrective regimen sliding scale   SubCutaneous three times a day before meals  insulin lispro (ADMELOG) corrective regimen sliding scale   SubCutaneous at bedtime  pantoprazole    Tablet 40 milliGRAM(s) Oral before breakfast  sodium chloride 0.9%. 1000 milliLiter(s) (200 mL/Hr) IV Continuous <Continuous>  sotalol. 80 milliGRAM(s) Oral every 12 hours    MEDICATIONS  (PRN):  ALPRAZolam 0.25 milliGRAM(s) Oral at bedtime PRN anxiety  dextrose Oral Gel 15 Gram(s) Oral once PRN Blood Glucose LESS THAN 70 milliGRAM(s)/deciliter    PHYSICAL EXAM:  T(C): 36.5 (07 Jun 2024 06:00), Max: 37.1 (06 Jun 2024 07:18)  T(F): 97.7 (07 Jun 2024 06:00), Max: 98.7 (06 Jun 2024 07:18)  HR: 80 (07 Jun 2024 05:00) (79 - 98)  BP: 92/74 (07 Jun 2024 05:00) (92/74 - 146/97)  BP(mean): 81 (07 Jun 2024 05:00) (79 - 119)  RR: 20 (07 Jun 2024 05:00) (18 - 29)  SpO2: 93% (07 Jun 2024 05:00) (92% - 97%)    Parameters below as of 07 Jun 2024 02:00  Patient On (Oxygen Delivery Method): room air    Constitutional: NAD, awake and alert  HEENT: PERR, EOMI, Normal Hearing, MMM  Neck: Soft and supple, No LAD, No JVD  Respiratory: Breath sounds are clear bilaterally, No wheezing, rales or rhonchi  Cardiovascular: S1 and S2, regular rate and rhythm, no Murmurs, gallops or rubs  Gastrointestinal: Bowel Sounds present, soft, nontender, nondistended, no guarding, no rebound  Extremities: No peripheral edema  Vascular: 2+ peripheral pulses  Neurological: A/O x 3, no focal deficits  Musculoskeletal: 5/5 strength b/l upper and lower extremities  Skin: No rashes    =======================================    INTERPRETATION OF TELEMETRY: SR    ========================================    LABS: All Labs Reviewed:                        14.5 13.33 )-----------( 233      ( 06 Jun 2024 05:29 )             42.3     06-07    140  |  111<H>  |  15  ----------------------------<  113<H>  4.1   |  26  |  0.68    Ca    8.7      07 Jun 2024 05:51  Phos  2.7     06-06  Mg     2.2     06-07    TPro  6.4  /  Alb  3.2<L>  /  TBili  0.9  /  DBili  x   /  AST  43<H>  /  ALT  98<H>  /  AlkPhos  54  06-06    PT/INR - ( 05 Jun 2024 11:54 )   PT: 12.4 sec;   INR: 1.10 ratio      PTT - ( 05 Jun 2024 11:54 )  PTT:31.3 sec  CARDIAC MARKERS ( 06 Jun 2024 05:29 )    x     / x     / 139 U/L / x     / x      CARDIAC MARKERS ( 05 Jun 2024 20:07 )  x     / x     / 183 U/L / x     / x        Troponin: 76.30 ng/L, Troponin: 117.02 ng/L, Troponin: 96.19 ng/L    CARDIAC TESTING:    < from: TTE Limited W or WO Ultrasound Enhancing Agent (06.05.24 @ 22:25) >   1. Left ventricular systolic function is normal with an ejection fraction of 55 % by Kennedy's method of disks.   2. The basal inferior wall appears hypokinetic.   3. Structurally normal pulmonic valve with normal leaflet excursion.   4. No significant valvular disease.    < end of copied text >      < from: TTE W or WO Ultrasound Enhancing Agent (05.21.24 @ 15:30) >  1. Left ventricular systolic function is normal with an ejection fraction of 55 % by Kennedy's method of disks.   2. Normal right ventricular cavity size and normal systolic function.   3. Mild mitral regurgitation.   4. Mild to moderate aortic regurgitation.   5. Mild to moderate left ventricular hypertrophy.   6. Thereis akinesis of the inferior wall but overall preserved LV function.    < from: Cardiac Catheterization (05.21.24 @ 10:51) >  Conclusions: There is significant triple vessel coronary artery disease. A successful intervention of the RCA was performed. RPDA appears a  and fills retrograde.   Recommendations: Aggressive risk factor modification with diet, exercise, and a goal LDL of less than 70.; Dual antiplatelet therapy should be continued for a minimum of 6 months

## 2024-06-07 NOTE — PROGRESS NOTE ADULT - ASSESSMENT
52 y/o male with recent inferior STEMI, s/p PCI of RCA on 5/21/24, normal EF, mild to mod LVH, presented with c/o palpitations, SOB, also endorses MALDONADO for 2-3 days     Found to have monomorphic VT  Received Amio by EMS     Received adenosine and then lidocaine in the ED   Subsequently underwent successful cardioversion with 120J by EP on 6/5     LHC 6/6 showed patent RCA stent       -started on sotalol per EP, EKG reviewed   -remains in NSR, BP and HR stable   -denies any complaints today  -goal K>4, Mg>2  -continue aspirin, Plavix, Lipitor   -OOB   -DVT ppx with Lovenox       Stable for transfer to [facility], sign out given to Dr. Allen  Detail Level: Zone Quality 337: Tuberculosis Prevention For Psoriasis And Psoriatic Arthritis Patients On A Biological Immune Response Modifier: Patient has a documented negative TB screening prior to treatment. Detail Level: Detailed

## 2024-06-07 NOTE — DISCHARGE NOTE NURSING/CASE MANAGEMENT/SOCIAL WORK - NSDCPETBCESMAN_GEN_ALL_CORE
Pt seen with NP for left foot wounds, pt's wife present.  Left lateral remains healed.  Lidocaine applied to medial left foot then debrided by NP.  Continue with ioplex-wife did not want applied here as has \"plenty\" at home, aquaphor & bandage applied.  F/u 3-4 weeks.   If you are a smoker, it is important for your health to stop smoking. Please be aware that second hand smoke is also harmful.

## 2024-06-07 NOTE — CHART NOTE - NSCHARTNOTEFT_GEN_A_CORE
Contacted by CCU manager that patient states that he will not stay if he is downgraded to telemetry.  Patient seen at bedside with family present  Discussed current medical treatment and the importance of staying in the hospital for monitoring while initiating Sotalol and having ICD placed on Monday.  Reviewed with patient that risk of signing out against medical advice, including death with verbalized understanding  Patient is adamant that he is unwilling to share a room and refuses to be downgraded to telemetry floor  Discussed with CCU team  Patient is aware that if he signs out AMA he is at risk for sudden cardiac death
52 yo male, recently admitted to  following inferior STEMI 5/21/24, s/p 1 ADOLFO to mRCA with pEF, who now presented with intermittent episodes of palpitations, diaphoresis, and shortness of breath over the past 2-3 days with dyspnea on exertion found to be in sustained ventricular tachycardia at Urgent Center, prompting transfer to  ED. Failed on Amiodarone 150mg IVPB, Lidocaine, adenosine to convert VT, s/p DCCV 250J with successful conversion to NSR. Initial troponin negative.      Pt brought to cath lab for ischemic evaluation.   Seen and examined in pt's room.   VSS, A+O x4, denies chest pain, sob or palpitation at this time.   - SHARONDA protocol IV hydration: NS 250cc IV bolus x1   - Procedure, its risks, alternatives, benefits and potential complications were discussed in detail. Risks include but not limited to bleeding, infection, allergy, renal failure requiring dialysis, stroke, vascular injury, pericardial tamponade, arrhythmias, MI and even death. Pt is agreeable and has consented for coronary angiogram with possible stent placement and possible sedation and/or analgesia.     ASA class: III   Cr: 0.78  GFR: 108  Bleeding risk: 1.5%   Jefry score: 1 point

## 2024-06-08 LAB
ANION GAP SERPL CALC-SCNC: 4 MMOL/L — LOW (ref 5–17)
BUN SERPL-MCNC: 17 MG/DL — SIGNIFICANT CHANGE UP (ref 7–23)
CALCIUM SERPL-MCNC: 8.6 MG/DL — SIGNIFICANT CHANGE UP (ref 8.5–10.1)
CHLORIDE SERPL-SCNC: 109 MMOL/L — HIGH (ref 96–108)
CO2 SERPL-SCNC: 25 MMOL/L — SIGNIFICANT CHANGE UP (ref 22–31)
CREAT SERPL-MCNC: 0.91 MG/DL — SIGNIFICANT CHANGE UP (ref 0.5–1.3)
EGFR: 102 ML/MIN/1.73M2 — SIGNIFICANT CHANGE UP
GLUCOSE SERPL-MCNC: 119 MG/DL — HIGH (ref 70–99)
HCT VFR BLD CALC: 46.4 % — SIGNIFICANT CHANGE UP (ref 39–50)
HGB BLD-MCNC: 16 G/DL — SIGNIFICANT CHANGE UP (ref 13–17)
MAGNESIUM SERPL-MCNC: 2.2 MG/DL — SIGNIFICANT CHANGE UP (ref 1.6–2.6)
MCHC RBC-ENTMCNC: 32.3 PG — SIGNIFICANT CHANGE UP (ref 27–34)
MCHC RBC-ENTMCNC: 34.5 GM/DL — SIGNIFICANT CHANGE UP (ref 32–36)
MCV RBC AUTO: 93.7 FL — SIGNIFICANT CHANGE UP (ref 80–100)
PHOSPHATE SERPL-MCNC: 4.2 MG/DL — SIGNIFICANT CHANGE UP (ref 2.5–4.5)
PLATELET # BLD AUTO: 265 K/UL — SIGNIFICANT CHANGE UP (ref 150–400)
POTASSIUM SERPL-MCNC: 3.8 MMOL/L — SIGNIFICANT CHANGE UP (ref 3.5–5.3)
POTASSIUM SERPL-SCNC: 3.8 MMOL/L — SIGNIFICANT CHANGE UP (ref 3.5–5.3)
RBC # BLD: 4.95 M/UL — SIGNIFICANT CHANGE UP (ref 4.2–5.8)
RBC # FLD: 12.6 % — SIGNIFICANT CHANGE UP (ref 10.3–14.5)
SODIUM SERPL-SCNC: 138 MMOL/L — SIGNIFICANT CHANGE UP (ref 135–145)
WBC # BLD: 9.8 K/UL — SIGNIFICANT CHANGE UP (ref 3.8–10.5)
WBC # FLD AUTO: 9.8 K/UL — SIGNIFICANT CHANGE UP (ref 3.8–10.5)

## 2024-06-08 PROCEDURE — 99233 SBSQ HOSP IP/OBS HIGH 50: CPT

## 2024-06-08 PROCEDURE — 93010 ELECTROCARDIOGRAM REPORT: CPT | Mod: 76

## 2024-06-08 PROCEDURE — 99232 SBSQ HOSP IP/OBS MODERATE 35: CPT

## 2024-06-08 RX ORDER — POTASSIUM CHLORIDE 20 MEQ
40 PACKET (EA) ORAL ONCE
Refills: 0 | Status: COMPLETED | OUTPATIENT
Start: 2024-06-08 | End: 2024-06-08

## 2024-06-08 RX ADMIN — CHLORHEXIDINE GLUCONATE 1 APPLICATION(S): 213 SOLUTION TOPICAL at 16:16

## 2024-06-08 RX ADMIN — CLOPIDOGREL BISULFATE 75 MILLIGRAM(S): 75 TABLET, FILM COATED ORAL at 10:38

## 2024-06-08 RX ADMIN — Medication 81 MILLIGRAM(S): at 10:38

## 2024-06-08 RX ADMIN — Medication 80 MILLIGRAM(S): at 18:35

## 2024-06-08 RX ADMIN — ATORVASTATIN CALCIUM 80 MILLIGRAM(S): 80 TABLET, FILM COATED ORAL at 21:23

## 2024-06-08 RX ADMIN — Medication 40 MILLIEQUIVALENT(S): at 10:37

## 2024-06-08 RX ADMIN — Medication 80 MILLIGRAM(S): at 05:38

## 2024-06-08 RX ADMIN — ENOXAPARIN SODIUM 40 MILLIGRAM(S): 100 INJECTION SUBCUTANEOUS at 18:35

## 2024-06-08 NOTE — PROGRESS NOTE ADULT - ASSESSMENT
Assessment and Plan:   · Assessment	  52 yo M with above PMHx including CAD/STEMI, s/p PCI of RCA on 5/21, LVEF 55%  Presented with symptomatic sustained VT, s/p DCCV.  s/p LHC- patent stent in RCA  - metoprolol d/c'd, started sotalol 80mg po BID yesterday Dose #3 this am-will continue to monitor QTc  - 12 leads EKG in 2 hrs post each dose for QTC. call EP if QTC>500ms or >50ms from baseline.   - Avoid QT prolonging agents. keep K+>4, Mg>2. Pt needs 6 doses in-pt loading.  - Pt is a candidate for ICD implant for secondary prevention for SCD  - keep pt NPO post MN on 6/9 for ICD implant on 6/10  - r/b/i/a well discussed with pt, all questions answered  - continue ASA/plavix  -hold enoxaparin Mon am prior to implantation

## 2024-06-08 NOTE — PROGRESS NOTE ADULT - SUBJECTIVE AND OBJECTIVE BOX
ELECTROPHYSIOLOGY  PROGRESS NOTE    Reason for follow up: sustained VT, Sotolol loading, planned ICD implantation Monday am  Overnight: No new events. NSR HR 70s with occ-frequent PVCs  Update:  EKG with QTc 506ms-will continue to monitor and assess for any further prolongation while on sotolol ( baseline post first dose was 492ms)  Denies any c/o    Subjective: "  ___I'm good___________________"    General: No fatigue, no fevers/chills  Respiratory: No dyspnea, no cough, no wheeze  CV: No chest pain, no palpitations  Abd: No nausea  Neuro: No headache, no dizziness  	  Vitals:  T(C): 37.1 (24 @ 06:00), Max: 37.3 (24 @ 20:11)  HR: 71 (24 @ 08:00) (71 - 91)  BP: 100/80 (24 @ 08:00) (94/67 - 138/90)  RR: 27 (24 @ 08:00) (16 - 28)  SpO2: 95% (24 @ 08:00) (92% - 96%)  Wt(kg): --  I&O's Summary    2024 07:01  -  2024 07:00  --------------------------------------------------------  IN: 360 mL / OUT: 700 mL / NET: -340 mL      Weight (kg): 66.8 ( @ 11:51)      PHYSICAL EXAM:  Appearance: Comfortable. No acute distress  HEENT:  Head and neck: Atraumatic. Normocephalic.  Normal oral mucosa, PERRL, Neck is supple. No JVD, No carotid bruit.   Neurologic: A & O x 3, no focal deficits. EOMI.  Lymphatic: No cervical lymphadenopathy  Cardiovascular: Normal S1 S2, No murmur, rubs/gallops. No JVD, No edema  Respiratory: Lungs clear to auscultation  Gastrointestinal:  Soft, Non-tender, + BS  Lower Extremities: No edema  Psychiatry: Patient is calm. No agitation. Mood & affect appropriate  Skin: No rashes/ ecchymoses/cyanosis/ulcers visualized on the face, hands or feet.      CURRENT MEDICATIONS:  sotalol. 80 milliGRAM(s) Oral every 12 hours    atorvastatin  aspirin enteric coated  chlorhexidine 4% Liquid  clopidogrel Tablet  enoxaparin Injectable  potassium chloride    Tablet ER      DIAGNOSTIC TESTING:  [ ] Echocardiogram:   TRANSTHORACIC ECHOCARDIOGRAM REPORT  ________________________________________________________________________________                                      _______       Pt. Name:       ANTHONY GARCIA Study Date:    2024  MRN:            [id number]             YOB: 1970  Accession #:    [id number]            Age:           53 years  Account#:       [id number]         Gender:        M  Visit ID#  Heart Rate:     88 bpm               Height:        69.69 in (177.00 cm)  Rhythm:                              Weight:        146.00 lb (66.23 kg)  Blood Pressure: 124/90 mmHg          BSA/BMI:       1.82 m² / 21.14 kg/m²  ________________________________________________________________________________________  Referring Physician:    [id number] Martin Bliss  Interpreting Physician: Kassy Hair  Primary Sonographer:    Noelle Ann    CPT:                ECHO TTE W CON [facility] - .m;DEFINITY ECHO CONTRAST PER                      ML - .m  Indication(s):      Ventricular tachycardia, unspecified - I47.20  Procedure:          Limited transthoracic echocardiogram.  Ordering Location:    Admission Status:   Inpatient  Contrast Injection: Verbal consent was obtained for injection of Ultrasonic        Enhancing Agent following a discussion of risks and                      benefits.                      Endocardial visualization enhanced with 2 ml of Definity                      Ultrasound enhancing agent (Lot#:[id number] Exp.Date:).  UEA Reaction:       Patient had no adverse reaction after injection of                      Ultrasound Enhancing Agent.  Study Information:  Image quality for this study is fair.    _______________________________________________________________________________________     CONCLUSIONS:      1. Left ventricular systolic function is normal with an ejection fraction of 55 % by Kennedy's method of disks.   2. The basal inferior wall appears hypokinetic.   3. Structurally normal pulmonic valve with normal leaflet excursion.   4. No significant valvular disease.    ________________________________________________________________________________________  FINDINGS:     Left Ventricle:  After obtaining consent, Definity ultrasound enhancing agent was given for enhanced left ventricular opacification and improved delineation of the left ventricular endocardial borders. Left ventricular systolic function is normal with a calculated ejection fraction of 55 % by the Kennedy's biplane method of disks. Unable to assess left ventricular diastolic function due to insufficient data. The basal inferior wall appears hypokinetic.     Pulmonic Valve:  Structurally normal pulmonic valve with normal leaflet excursion.     Pericardium:  No pericardial effusion seen.  ____________________________________________________________________  QUANTITATIVE DATA:  Left Ventricle Measurements: (Indexed to BSA)    [ ]  Catheterization:  Study Date:     2024   Name:           ANTHONY GARCIA   :            1970   (53 years)   Gender:         male   MR#:            [id number]   [facility]#:           [id number]   Patient Class:  Inpatient     Cath Lab Report    Diagnostic Cardiologist:       Rick Dueñas MD   Interventional Cardiologist:   Rick Dueñas MD     Procedures Performed   Procedures:               1.    Arterial Access - Right Radial     2.    PCI: ADOLFO   3.    Left Heart Cath   4.    Diagnostic Coronary Angiography     Indications:                Myocardial infarction with ST elevation  (STEMI)    Conclusions:   There is significant triple vessel coronary artery disease.A  successful intervention of the RCA was performed.RPDA appears a   and fills retrograde.   Recommendations:   Aggressive risk factor modification with diet, exercise, and a goal  LDL of less than 70.; Dual antiplatelet therapy should be  continued for a minimum of 6 months     Procedure Narrative:   The risks and alternatives of the procedures and conscious sedation  were explained to the patient and informed consent was  obtained. The patient was brought to the cath lab and placed on the  exam table.  Access   Right radial artery:   The puncture site was infiltrated with 1% Lidocaine. Vascular access  was obtained using modified seldinger technique.  Hemostasis/Sheath Status:  using mechanical compression.      Coronary Angiography   Left Coronary System:   A catheter was positioned into the vessel ostia under fluoroscopic  guidance. Angiograms were obtained in multiple views.  Right Coronary System:   A catheter was positioned into the vessel ostia under fluoroscopic  guidance. Angiograms were obtained in multiple views.    Diagnostic Findings:     Coronary Angiography   LM   Left main artery: Angiography shows minor irregularities. There is a  10 % stenosis in the distal third portion of the segment.  VAMSHI Flow 3.      LAD     Patient: ANTHONY GARCIA         MRN: [id number]  Study Date: 2024   10:51 AM    Page 1 of 4          Left anterior descending artery: Angiography shows minor  irregularities. VAMSHI Flow 3.    CX   Circumflex: Angiography shows minor irregularities. VAMSHI Flow 3.      RCA   Right coronary artery: Angiography shows severe atherosclerosis. There  is a 100 % stenosis in the middle third portion of the  segment. VAMSHI Flow 0. Right posterior descending artery: Angiography  shows severe atherosclerosis.  Fills retrograde..  There is a 100 % stenosis in the middle third portionof the segment.  VAMSHI Flow 2.    Left Heart Cath   Left ventricular function was assessed. Ejection fraction was visually  estimated by estimation. LV to AO pullback was  performed. LHC performed: Aortic valve crossed and left ventricular  pressure obtained.    Total Dose Area Product:            07197.99 mGym2   Total Air Kerma:                    3478 mGy   Total Fluoro Time:                  26.3 min     Diagnostic Physician Signature:     Electronically signed by Rick Dueñas MD on 2024 at 02:09  PM    Interventional Findings:     Interventional Details   Mid right coronary artery: The initial stenosis was 90 %. Guidewire  crossing was successful.    LABS:	 	  CARDIAC MARKERS ( 2024 05:29 )  x     / x     / 139 U/L / x     / x      p-BNP 2024 05:29: x    , CARDIAC MARKERS ( 2024 20:07 )  x     / x     / 183 U/L / x     / x      p-BNP 2024 20:07: x                              16.0   9.80  )-----------( 265      ( 2024 05:36 )             46.4     06-08    138  |  109<H>  |  17  ----------------------------<  119<H>  3.8   |  25  |  0.91    Ca    8.6      2024 05:36  Phos  4.2     06-08  Mg     2.2     06-08      proBNP:   Lipid Profile:   HgA1c:   TSH:       TELEMETRY: Reviewed  NSR 70s with occ-frequent PVCs   ECG:  Reviewed by me. 	  Ventricular Rate 76 BPM    Atrial Rate 76 BPM    P-R Interval 132 ms    QRS Duration 86 ms    Q-T Interval 438 ms    QTC Calculation(Bazett) 492 ms    P Axis 58 degrees    R Axis 45 degrees    T Axis -8 degrees    Diagnosis Line Normal sinus rhythm  Right atrial enlargement  T wave abnormality, consider inferior ischemia  Abnormal ECG  When compared with ECG of 2024 07:25,  No significant change was found  Confirmed by Shaylee Mishra ([id number]) on 2024 3:59:43 PM

## 2024-06-08 NOTE — PROGRESS NOTE ADULT - ASSESSMENT
Assessment and Plan:   · Assessment	  52 y/o male with recent inferior STEMI, s/p PCI of RCA on 5/21/24, normal EF, mild to mod LVH, presented with c/o palpitations, SOB, also endorses MALDONADO for 2-3 days     Found to have monomorphic VT  Received Amio by EMS     Received adenosine and then lidocaine in the ED   Subsequently underwent successful cardioversion with 120J by EP on 6/5     LHC 6/6 showed patent RCA stent       -started on sotalol per EP, EKG reviewed   -remains in NSR, BP and HR stable   -denies any complaints today  -goal K>4, Mg>2  -continue aspirin, Plavix, Lipitor   -OOB   -DVT ppx with Lovenox  Assessment and Plan:   · Assessment	  54 y/o male with recent inferior STEMI, s/p PCI of RCA on 5/21/24, normal EF, mild to mod LVH, presented with c/o palpitations, SOB,     -denies any complaints today  Found to have monomorphic VT  Received Amiodarone by EMS     Received adenosine and then lidocaine in the ED   Subsequently underwent successful cardioversion with 120J by EP on 6/5     LHC 6/6 showed patent RCA stent     Continue Toprol   Sotalol loading initiated on 6/6, monitor QTC K4, Mg2  Continue aspirin, plavix, statin  Echocardiogram reviewed, LVEF 55%  Awaiting ICD placement monday AM. Keep NPO after MN sunday    Monitor renal function closely .   Advised to increase po Hydration.   Avoid nephrotoxins .   PreDM- Follow up with PCP     -OOB   -DVT ppx with Lovenox  Assessment and Plan:   · Assessment	  54 y/o male with recent inferior STEMI, s/p PCI of RCA on 5/21/24, normal EF, mild to mod LVH, presented with c/o palpitations, SOB,     -denies any complaints today  Found to have monomorphic VT  Received Amiodarone by EMS     Received adenosine and then lidocaine in the ED   Subsequently underwent successful cardioversion with 120J by EP on 6/5     LHC 6/6 showed patent RCA stent     Continue Toprol   Sotalol loading initiated on 6/6, monitor QTC K4, Mg2  Continue aspirin, plavix, statin  Echocardiogram reviewed, LVEF 55%  Awaiting ICD placement monday AM. Keep NPO after MN sunday    Monitor renal function closely .   Advised to increase po Hydration.   Avoid nephrotoxins .     PreDM- Follow up with PCP     Transaminitis - Monitor ,  avoid hepato toxins.     -OOB   -DVT ppx with Lovenox

## 2024-06-08 NOTE — PROGRESS NOTE ADULT - SUBJECTIVE AND OBJECTIVE BOX
Cardiology Progress Note    CHIEF COMPLAINT: Patient is a 53y old  Male who presents with a chief complaint of     FROM H&P: 52 y/o male with a PMHx of seasonal allergies, ventral hernia presents to the ED BIBA from urgent care for Vtach. Pt was seen in ED on 5/21, EKG showing +STEMI and pt went to cath lab. Pt s/p LHC S/P ADOLFO to RCA on 81mg ASA and Plavix. Pt reports palpitations since 7am. Pt went to urgent care, EKG showing Vtach and pt brought to ED for evaluation. EMS administered Amiodarone 150mg. Denies CP, SOB, syncope. No EtOH use. No drug use. No other complaints at this time. Cardio: Dr. Dueñas.  IV lidocaine given, 120 J synch biphasic cardioversion resulted in restoration of sinus rhythm by EP.    6/5. Doing ok, a little tired.   No CP or SOB at the moment. SR    6/6. no overnight events    6/7. S/p cath yesterday > revealed patent RCA stent,  of RPDA with collaterals which has no changes from prior. Feeling well, no complaints, no events on tele, qtc stable so far.    6/8. No events last pm. No CP/SOB. Lying flat in bed. SR on tele. Sotalol loading now and awaiting ICD placement for monday.     MEDICATIONS  (STANDING):  aspirin enteric coated 81 milliGRAM(s) Oral daily  atorvastatin 80 milliGRAM(s) Oral at bedtime  chlorhexidine 4% Liquid 1 Application(s) Topical daily  clopidogrel Tablet 75 milliGRAM(s) Oral daily  dextrose 10% Bolus 125 milliLiter(s) IV Bolus once  dextrose 5%. 1000 milliLiter(s) (50 mL/Hr) IV Continuous <Continuous>  dextrose 5%. 1000 milliLiter(s) (100 mL/Hr) IV Continuous <Continuous>  dextrose 50% Injectable 25 Gram(s) IV Push once  dextrose 50% Injectable 12.5 Gram(s) IV Push once  glucagon  Injectable 1 milliGRAM(s) IntraMuscular once  insulin lispro (ADMELOG) corrective regimen sliding scale   SubCutaneous three times a day before meals  insulin lispro (ADMELOG) corrective regimen sliding scale   SubCutaneous at bedtime  pantoprazole    Tablet 40 milliGRAM(s) Oral before breakfast  sodium chloride 0.9%. 1000 milliLiter(s) (200 mL/Hr) IV Continuous <Continuous>  sotalol. 80 milliGRAM(s) Oral every 12 hours    MEDICATIONS  (PRN):  ALPRAZolam 0.25 milliGRAM(s) Oral at bedtime PRN anxiety  dextrose Oral Gel 15 Gram(s) Oral once PRN Blood Glucose LESS THAN 70 milliGRAM(s)/deciliter    PHYSICAL EXAM:  T(C): 36.5 (07 Jun 2024 06:00), Max: 37.1 (06 Jun 2024 07:18)  T(F): 97.7 (07 Jun 2024 06:00), Max: 98.7 (06 Jun 2024 07:18)  HR: 80 (07 Jun 2024 05:00) (79 - 98)  BP: 92/74 (07 Jun 2024 05:00) (92/74 - 146/97)  BP(mean): 81 (07 Jun 2024 05:00) (79 - 119)  RR: 20 (07 Jun 2024 05:00) (18 - 29)  SpO2: 93% (07 Jun 2024 05:00) (92% - 97%)    Parameters below as of 07 Jun 2024 02:00  Patient On (Oxygen Delivery Method): room air    Constitutional: NAD, awake and alert  HEENT: PERR, EOMI, Normal Hearing, MMM  Neck: Soft and supple, No LAD, No JVD  Respiratory: Breath sounds are clear bilaterally, No wheezing, rales or rhonchi  Cardiovascular: S1 and S2, regular rate and rhythm, no Murmurs, gallops or rubs  Gastrointestinal: Bowel Sounds present, soft, nontender, nondistended, no guarding, no rebound  Extremities: No peripheral edema  Vascular: 2+ peripheral pulses  Neurological: A/O x 3, no focal deficits  Musculoskeletal: 5/5 strength b/l upper and lower extremities  Skin: No rashes    =======================================    INTERPRETATION OF TELEMETRY: SR    ========================================    LABS: All Labs Reviewed:                        14.5   13.33 )-----------( 233      ( 06 Jun 2024 05:29 )             42.3     06-07    140  |  111<H>  |  15  ----------------------------<  113<H>  4.1   |  26  |  0.68    Ca    8.7      07 Jun 2024 05:51  Phos  2.7     06-06  Mg     2.2     06-07    TPro  6.4  /  Alb  3.2<L>  /  TBili  0.9  /  DBili  x   /  AST  43<H>  /  ALT  98<H>  /  AlkPhos  54  06-06    PT/INR - ( 05 Jun 2024 11:54 )   PT: 12.4 sec;   INR: 1.10 ratio      PTT - ( 05 Jun 2024 11:54 )  PTT:31.3 sec  CARDIAC MARKERS ( 06 Jun 2024 05:29 )    x     / x     / 139 U/L / x     / x      CARDIAC MARKERS ( 05 Jun 2024 20:07 )  x     / x     / 183 U/L / x     / x        Troponin: 76.30 ng/L, Troponin: 117.02 ng/L, Troponin: 96.19 ng/L    CARDIAC TESTING:    < from: TTE Limited W or WO Ultrasound Enhancing Agent (06.05.24 @ 22:25) >   1. Left ventricular systolic function is normal with an ejection fraction of 55 % by Kennedy's method of disks.   2. The basal inferior wall appears hypokinetic.   3. Structurally normal pulmonic valve with normal leaflet excursion.   4. No significant valvular disease.    < end of copied text >      < from: TTE W or WO Ultrasound Enhancing Agent (05.21.24 @ 15:30) >  1. Left ventricular systolic function is normal with an ejection fraction of 55 % by Kennedy's method of disks.   2. Normal right ventricular cavity size and normal systolic function.   3. Mild mitral regurgitation.   4. Mild to moderate aortic regurgitation.   5. Mild to moderate left ventricular hypertrophy.   6. Thereis akinesis of the inferior wall but overall preserved LV function.    < from: Cardiac Catheterization (05.21.24 @ 10:51) >  Conclusions: There is significant triple vessel coronary artery disease. A successful intervention of the RCA was performed. RPDA appears a  and fills retrograde.   Recommendations: Aggressive risk factor modification with diet, exercise, and a goal LDL of less than 70.; Dual antiplatelet therapy should be continued for a minimum of 6 months

## 2024-06-08 NOTE — PROGRESS NOTE ADULT - SUBJECTIVE AND OBJECTIVE BOX
CC: Sustained ventricular tachycardia (08 Jun 2024 09:41)    HPI:  54 yo male, recently admitted to  following inferior STEMI 5/21 s/p 1 ADOLFO to [facility] with pEF, who now presented with palpitations. Patient developed palpitations at rest today with associated symptoms of diaphoresis and shortness of breath. His son put his iWatch on him and found his heart rate to be 180 bpm. He presented to Urgent Care for evaluation and was found to be in sustained ventricular tachycardia, prompting transfer to  ED. Of note, patient reports intermittent episodes of palpitations, diaphoresis, and shortness of breath over the past 2-3 days with dyspnea on exertion; denies any episodes of chest pain or LE edema. Patient was given Amiodarone 150mg IVPB by EMS without improvement. In ED, EP was consulted, patient was given Lidocaine without improvement. EP was unsure if rhythm was SVT with aberrancy, therefore given a trial of Adenosine without resolution. Ultimately decision was made to proceed with DCCV 250J with successful conversion to NSR. Initial troponin negative.   (05 Jun 2024 18:44)    INTERVAL HPI/OVERNIGHT EVENTS:    Vital Signs Last 24 Hrs  T(C): 36.4 (08 Jun 2024 11:03), Max: 37.3 (07 Jun 2024 20:11)  T(F): 97.6 (08 Jun 2024 11:03), Max: 99.2 (07 Jun 2024 20:11)  HR: 81 (08 Jun 2024 14:00) (65 - 108)  BP: 113/87 (08 Jun 2024 12:00) (94/67 - 137/88)  BP(mean): 95 (08 Jun 2024 12:00) (76 - 103)  RR: 19 (08 Jun 2024 10:00) (16 - 28)  SpO2: 95% (08 Jun 2024 14:00) (92% - 96%)    Parameters below as of 08 Jun 2024 08:00  Patient On (Oxygen Delivery Method): room air      I&O's Detail    07 Jun 2024 07:01  -  08 Jun 2024 07:00  --------------------------------------------------------  IN:    Oral Fluid: 360 mL  Total IN: 360 mL    OUT:    Voided (mL): 700 mL  Total OUT: 700 mL    Total NET: -340 mL      08 Jun 2024 07:01  -  08 Jun 2024 14:50  --------------------------------------------------------  IN:  Total IN: 0 mL    OUT:    Voided (mL): 500 mL  Total OUT: 500 mL    Total NET: -500 mL        REVIEW OF SYSTEMS:    CONSTITUTIONAL: No weakness, fevers or chills  EYES/ENT: No visual changes;  No vertigo or throat pain   NECK: No pain or stiffness  RESPIRATORY: No cough, wheezing, hemoptysis; No shortness of breath  CARDIOVASCULAR: No chest pain or palpitations  GASTROINTESTINAL: No abdominal or epigastric pain. No nausea, vomiting, or hematemesis; No diarrhea or constipation. No melena or hematochezia.  GENITOURINARY: No dysuria, frequency or hematuria  NEUROLOGICAL: No numbness or weakness  SKIN: No itching, burning, rashes, or lesions   All other review of systems is negative unless indicated above.  PHYSICAL EXAM:    General: in no acute distress  Eyes: PERRLA, EOMI; conjunctiva and sclera clear  Head: Normocephalic; atraumatic  ENMT: No nasal discharge; airway clear  Neck: Supple; non tender; no masses  Respiratory: No wheezes, rales or rhonchi  Cardiovascular: Regular rate and rhythm. S1 and S2 Normal; No murmurs, gallops or rubs  Gastrointestinal: Soft non-tender non-distended; Normal bowel sounds  Genitourinary: No  suprapubic  tenderness  Extremities: Normal range of motion, No clubbing, cyanosis or edema  Vascular: Peripheral pulses palpable 2+ bilaterally  Neurological: Alert and oriented x4  Skin: Warm and dry. No acute rash  Lymph Nodes: No acute cervical adenopathy  Musculoskeletal: Normal muscle tone, without deformities  Psychiatric: Cooperative and appropriate                            16.0   9.80  )-----------( 265      ( 08 Jun 2024 05:36 )             46.4     08 Jun 2024 05:36    138    |  109    |  17     ----------------------------<  119    3.8     |  25     |  0.91     Ca    8.6        08 Jun 2024 05:36  Phos  4.2       08 Jun 2024 05:36  Mg     2.2       08 Jun 2024 05:36        CAPILLARY BLOOD GLUCOSE          Urinalysis Basic - ( 08 Jun 2024 05:36 )    Color: x / Appearance: x / SG: x / pH: x  Gluc: 119 mg/dL / Ketone: x  / Bili: x / Urobili: x   Blood: x / Protein: x / Nitrite: x   Leuk Esterase: x / RBC: x / WBC x   Sq Epi: x / Non Sq Epi: x / Bacteria: x        MEDICATIONS  (STANDING):  aspirin enteric coated 81 milliGRAM(s) Oral daily  atorvastatin 80 milliGRAM(s) Oral at bedtime  chlorhexidine 4% Liquid 1 Application(s) Topical daily  clopidogrel Tablet 75 milliGRAM(s) Oral daily  enoxaparin Injectable 40 milliGRAM(s) SubCutaneous every 24 hours  sotalol. 80 milliGRAM(s) Oral every 12 hours    MEDICATIONS  (PRN):  acetaminophen     Tablet .. 650 milliGRAM(s) Oral every 6 hours PRN Temp greater or equal to 38C (100.4F), Mild Pain (1 - 3)  aluminum hydroxide/magnesium hydroxide/simethicone Suspension 30 milliLiter(s) Oral every 4 hours PRN Dyspepsia  ondansetron Injectable 4 milliGRAM(s) IV Push every 8 hours PRN Nausea and/or Vomiting      RADIOLOGY & ADDITIONAL TESTS: CC: Sustained ventricular tachycardia (08 Jun 2024 09:41)    HPI:  54 yo male, recently admitted to  following inferior STEM[facility] 5[facility]21 [facility], who now presented with palpitations. Patient developed palpitations at rest today with associated symptoms of diaphoresis and shortness of breath. His son put his iWatch on him and found his heart rate to be 180 bpm. He presented to Urgent Care for evaluation and was found to be in sustained ventricular tachycardia, prompting transfer to  ED. Of note, patient reports intermittent episodes of palpitations, diaphoresis, and shortness of breath over the past 2-3 days with dyspnea on exertion; denies any episodes of chest pain or LE edema. Patient was given Amiodarone 150mg IVPB by EMS without improvement. In ED, EP was consulted, patient was given Lidocaine without improvement. EP was unsure if rhythm was SVT with aberrancy, therefore given a trial of Adenosine without resolution. Ultimately decision was made to proceed with DCCV 250J with successful conversion to NSR. Initial troponin negative.   (05 Jun 2024 18:44)    INTERVAL HPI/OVERNIGHT EVENTS:   S/p cath yesterday > revealed patent RCA stent,  of RPDA with collaterals which has no changes from prior. Feeling well, no complaints, no events on tele, qtc stable so far.    6/8. No events last pm. No CP/SOB. Lying flat in bed. SR on tele. Sotalol loading now and awaiting ICD placement for monday.     Vital Signs Last 24 Hrs  T(C): 36.4 (08 Jun 2024 11:03), Max: 37.3 (07 Jun 2024 20:11)  T(F): 97.6 (08 Jun 2024 11:03), Max: 99.2 (07 Jun 2024 20:11)  HR: 81 (08 Jun 2024 14:00) (65 - 108)  BP: 113/87 (08 Jun 2024 12:00) (94/67 - 137/88)  BP(mean): 95 (08 Jun 2024 12:00) (76 - 103)  RR: 19 (08 Jun 2024 10:00) (16 - 28)  SpO2: 95% (08 Jun 2024 14:00) (92% - 96%)    Parameters below as of 08 Jun 2024 08:00  Patient On (Oxygen Delivery Method): room air      I&O's Detail    07 Jun 2024 07:01  -  08 Jun 2024 07:00  --------------------------------------------------------  IN:    Oral Fluid: 360 mL  Total IN: 360 mL    OUT:    Voided (mL): 700 mL  Total OUT: 700 mL    Total NET: -340 mL      08 Jun 2024 07:01  -  08 Jun 2024 14:50  --------------------------------------------------------  IN:  Total IN: 0 mL    OUT:    Voided (mL): 500 mL  Total OUT: 500 mL    Total NET: -500 mL        REVIEW OF SYSTEMS:    CONSTITUTIONAL: No weakness, fevers or chills  EYES/ENT: No visual changes;  No vertigo or throat pain   NECK: No pain or stiffness  RESPIRATORY: No cough, wheezing, hemoptysis; No shortness of breath  CARDIOVASCULAR: No chest pain or palpitations  GASTROINTESTINAL: No abdominal or epigastric pain. No nausea, vomiting, or hematemesis; No diarrhea or constipation. No melena or hematochezia.  GENITOURINARY: No dysuria, frequency or hematuria  NEUROLOGICAL: No numbness or weakness  SKIN: No itching, burning, rashes, or lesions   All other review of systems is negative unless indicated above.      PHYSICAL EXAM:    General: in no acute distress  Eyes: PERRLA, EOMI; conjunctiva and sclera clear  Head: Normocephalic; atraumatic  ENMT: No nasal discharge; airway clear  Neck: Supple; non tender; no masses  Respiratory: No wheezes, rales or rhonchi  Cardiovascular: Regular rate and rhythm. S1 and S2 Normal;   Gastrointestinal: Soft non-tender non-distended; Normal bowel sounds  Extremities: Normal range of motion,  Vascular: Peripheral pulses palpable 2+ bilaterally  Neurological: Alert and oriented x4  Skin: Warm and dry. No acute rash  Psychiatric: Cooperative and appropriate                            16.0   9.80  )-----------( 265      ( 08 Jun 2024 05:36 )             46.4     08 Jun 2024 05:36    138    |  109    |  17     ----------------------------<  119    3.8     |  25     |  0.91     Ca    8.6        08 Jun 2024 05:36  Phos  4.2       08 Jun 2024 05:36  Mg     2.2       08 Jun 2024 05:36        CAPILLARY BLOOD GLUCOSE          Urinalysis Basic - ( 08 Jun 2024 05:36 )    Color: x / Appearance: x / SG: x / pH: x  Gluc: 119 mg/dL / Ketone: x  / Bili: x / Urobili: x   Blood: x / Protein: x / Nitrite: x   Leuk Esterase: x / RBC: x / WBC x   Sq Epi: x / Non Sq Epi: x / Bacteria: x        MEDICATIONS  (STANDING):  aspirin enteric coated 81 milliGRAM(s) Oral daily  atorvastatin 80 milliGRAM(s) Oral at bedtime  chlorhexidine 4% Liquid 1 Application(s) Topical daily  clopidogrel Tablet 75 milliGRAM(s) Oral daily  enoxaparin Injectable 40 milliGRAM(s) SubCutaneous every 24 hours  sotalol. 80 milliGRAM(s) Oral every 12 hours    MEDICATIONS  (PRN):  acetaminophen     Tablet .. 650 milliGRAM(s) Oral every 6 hours PRN Temp greater or equal to 38C (100.4F), Mild Pain (1 - 3)  aluminum hydroxide/magnesium hydroxide/simethicone Suspension 30 milliLiter(s) Oral every 4 hours PRN Dyspepsia  ondansetron Injectable 4 milliGRAM(s) IV Push every 8 hours PRN Nausea and/or Vomiting      RADIOLOGY & ADDITIONAL TESTS:

## 2024-06-08 NOTE — PROGRESS NOTE ADULT - ASSESSMENT
54 y/o male with a PMHx of seasonal allergies, ventral hernia, 5/21 +STEMI s/p LHC S/P ADOLFO to RCA. He went to urgent care and was found to be in VT, EMS called, amiodarone given in route.  IV lidocaine given, 120 J synch biphasic cardioversion resulted in restoration of sinus rhythm by EP and admitted to CCU for further management.    #Monomorphic VT s/p CV  #Recent STEMI. s/p PCI to RCA  #Dyslipidemia  #Pre-Diabetic - a1c 6.2  #Transaminitis - monitor    Monitor on tele  6/6/24: LH: revealed patent RCA stent,  of RPDA with collaterals which has no changes from prior.   Continue Toprol   Sotalol loading initiated on 6/6, monitor QTC K4, Mg2  Continue aspirin, plavix, statin  Echocardiogram reviewed, LVEF 55%  Awaiting ICD placement monday AM. Keep NPO after MN sunday    Will follow

## 2024-06-09 LAB
ANION GAP SERPL CALC-SCNC: 4 MMOL/L — LOW (ref 5–17)
BLD GP AB SCN SERPL QL: SIGNIFICANT CHANGE UP
BUN SERPL-MCNC: 18 MG/DL — SIGNIFICANT CHANGE UP (ref 7–23)
CALCIUM SERPL-MCNC: 9 MG/DL — SIGNIFICANT CHANGE UP (ref 8.5–10.1)
CHLORIDE SERPL-SCNC: 109 MMOL/L — HIGH (ref 96–108)
CO2 SERPL-SCNC: 26 MMOL/L — SIGNIFICANT CHANGE UP (ref 22–31)
CREAT SERPL-MCNC: 0.65 MG/DL — SIGNIFICANT CHANGE UP (ref 0.5–1.3)
EGFR: 114 ML/MIN/1.73M2 — SIGNIFICANT CHANGE UP
GLUCOSE SERPL-MCNC: 124 MG/DL — HIGH (ref 70–99)
HCT VFR BLD CALC: 48.3 % — SIGNIFICANT CHANGE UP (ref 39–50)
HGB BLD-MCNC: 16.7 G/DL — SIGNIFICANT CHANGE UP (ref 13–17)
MAGNESIUM SERPL-MCNC: 2 MG/DL — SIGNIFICANT CHANGE UP (ref 1.6–2.6)
MCHC RBC-ENTMCNC: 32 PG — SIGNIFICANT CHANGE UP (ref 27–34)
MCHC RBC-ENTMCNC: 34.6 GM/DL — SIGNIFICANT CHANGE UP (ref 32–36)
MCV RBC AUTO: 92.5 FL — SIGNIFICANT CHANGE UP (ref 80–100)
PLATELET # BLD AUTO: 276 K/UL — SIGNIFICANT CHANGE UP (ref 150–400)
POTASSIUM SERPL-MCNC: 4 MMOL/L — SIGNIFICANT CHANGE UP (ref 3.5–5.3)
POTASSIUM SERPL-SCNC: 4 MMOL/L — SIGNIFICANT CHANGE UP (ref 3.5–5.3)
RBC # BLD: 5.22 M/UL — SIGNIFICANT CHANGE UP (ref 4.2–5.8)
RBC # FLD: 12.6 % — SIGNIFICANT CHANGE UP (ref 10.3–14.5)
SODIUM SERPL-SCNC: 139 MMOL/L — SIGNIFICANT CHANGE UP (ref 135–145)
WBC # BLD: 9.68 K/UL — SIGNIFICANT CHANGE UP (ref 3.8–10.5)
WBC # FLD AUTO: 9.68 K/UL — SIGNIFICANT CHANGE UP (ref 3.8–10.5)

## 2024-06-09 PROCEDURE — 93010 ELECTROCARDIOGRAM REPORT: CPT

## 2024-06-09 PROCEDURE — 99232 SBSQ HOSP IP/OBS MODERATE 35: CPT

## 2024-06-09 PROCEDURE — 99233 SBSQ HOSP IP/OBS HIGH 50: CPT

## 2024-06-09 RX ORDER — ALPRAZOLAM 0.25 MG
0.25 TABLET ORAL ONCE
Refills: 0 | Status: DISCONTINUED | OUTPATIENT
Start: 2024-06-09 | End: 2024-06-09

## 2024-06-09 RX ORDER — CEFAZOLIN SODIUM 1 G
2000 VIAL (EA) INJECTION ONCE
Refills: 0 | Status: COMPLETED | OUTPATIENT
Start: 2024-06-09 | End: 2024-06-10

## 2024-06-09 RX ADMIN — CHLORHEXIDINE GLUCONATE 1 APPLICATION(S): 213 SOLUTION TOPICAL at 12:00

## 2024-06-09 RX ADMIN — Medication 0.25 MILLIGRAM(S): at 22:30

## 2024-06-09 RX ADMIN — Medication 80 MILLIGRAM(S): at 06:00

## 2024-06-09 RX ADMIN — ATORVASTATIN CALCIUM 80 MILLIGRAM(S): 80 TABLET, FILM COATED ORAL at 21:42

## 2024-06-09 RX ADMIN — CLOPIDOGREL BISULFATE 75 MILLIGRAM(S): 75 TABLET, FILM COATED ORAL at 10:17

## 2024-06-09 RX ADMIN — Medication 81 MILLIGRAM(S): at 10:17

## 2024-06-09 RX ADMIN — Medication 80 MILLIGRAM(S): at 18:19

## 2024-06-09 NOTE — PROGRESS NOTE ADULT - ASSESSMENT
Assessment and Plan:   · Assessment	  52 yo M with above PMHx including CAD/STEMI, s/p PCI of RCA on 5/21, LVEF 55%  Presented with symptomatic sustained VT, s/p DCCV.  s/p LHC- patent stent in RCA  - continue sotalol 80mg po BID  Dose #5  this am-will continue to monitor QTc-494ms this am  - 12 lead EKG in 2 hrs post each dose for QTC. call EP if QTC>500ms or >50ms from baseline.   - Avoid QT prolonging agents. keep K+>4, Mg>2. Pt needs 6 doses in-pt loading. Dose #5 given this am QTc 494ms  - Pt  for ICD implant for secondary prevention for SCD-educated re: periprocedural phase, postoperative instructions  - keep pt NPO post MN tonight  for ICD implant on 6/10 in am  - r/b/i/a well discussed with pt, all questions answered  - continue ASA/plavix/ DAPT uninterrupted  -enoxaparin for DVT prophylaxis held  -will f/u with C Dr Dueñas and Dr Murphy

## 2024-06-09 NOTE — PROGRESS NOTE ADULT - ASSESSMENT
52 y/o male with a PMHx of seasonal allergies, ventral hernia, 5/21 +STEMI s/p LHC S/P ADOLFO to RCA. He went to urgent care and was found to be in VT, EMS called, amiodarone given in route.  IV lidocaine given, 120 J synch biphasic cardioversion resulted in restoration of sinus rhythm by EP and admitted to CCU for further management.    #Monomorphic VT s/p CV  #Recent STEMI. s/p PCI to RCA  #Dyslipidemia  #Pre-Diabetic - a1c 6.2  #Transaminitis - monitor    Monitor on tele  6/6/24: LHC: revealed patent RCA stent,  of RPDA with collaterals which has no changes from prior.   Sotalol loading initiated on 6/6, monitor QTC K4, Mg2  Continue aspirin, plavix, statin  Echocardiogram reviewed, LVEF 55%  Awaiting ICD placement tomorrow AM. Keep NPO after MN sunday    Will follow

## 2024-06-09 NOTE — PROGRESS NOTE ADULT - ASSESSMENT
Assessment and Plan:   	  54 y/o male with recent inferior STEMI, s/p PCI of RCA on 5/21/24, normal EF, mild to mod LVH, presented with c/o palpitations, SOB,     -denies any complaints today  Found to have monomorphic VT  Received Amiodarone by EMS     Received adenosine and then lidocaine in the ED   Subsequently underwent successful cardioversion with 120J by EP on 6/5     LHC 6/6 showed patent RCA stent     Continue Toprol   Sotalol loading initiated on 6/6, monitor QTC K4, Mg2  Continue aspirin, plavix, statin  Echocardiogram reviewed, LVEF 55%  Awaiting ICD placement monday AM.   Keep NPO after MN sunday    Monitor renal function closely .   Advised to increase po Hydration.   Avoid nephrotoxins .     PreDM- Follow up with PCP     Transaminitis - Monitor ,  avoid hepato toxins.     D/W RN , Patient

## 2024-06-09 NOTE — PROGRESS NOTE ADULT - SUBJECTIVE AND OBJECTIVE BOX
Cardiology Progress Note    CHIEF COMPLAINT: Patient is a 53y old  Male who presents with a chief complaint of     FROM H&P: 54 y/o male with a PMHx of seasonal allergies, ventral hernia presents to the ED BIBA from urgent care for Vtach. Pt was seen in ED on 5/21, EKG showing +STEMI and pt went to cath lab. Pt s/p LHC S/P ADOLFO to RCA on 81mg ASA and Plavix. Pt reports palpitations since 7am. Pt went to urgent care, EKG showing Vtach and pt brought to ED for evaluation. EMS administered Amiodarone 150mg. Denies CP, SOB, syncope. No EtOH use. No drug use. No other complaints at this time. Cardio: Dr. Dueñas.  IV lidocaine given, 120 J synch biphasic cardioversion resulted in restoration of sinus rhythm by EP.    6/5. Doing ok, a little tired.   No CP or SOB at the moment. SR    6/6. no overnight events    6/7. S/p cath yesterday > revealed patent RCA stent,  of RPDA with collaterals which has no changes from prior. Feeling well, no complaints, no events on tele, qtc stable so far.    6/8. No events last pm. No CP/SOB. Lying flat in bed. SR on tele. Sotalol loading now and awaiting ICD placement for monday.     6/9. No events last pm. No CP/SOB. SR on tele. For ICD placement monday AM.    MEDICATIONS  (STANDING):  aspirin enteric coated 81 milliGRAM(s) Oral daily  atorvastatin 80 milliGRAM(s) Oral at bedtime  chlorhexidine 4% Liquid 1 Application(s) Topical daily  clopidogrel Tablet 75 milliGRAM(s) Oral daily  dextrose 10% Bolus 125 milliLiter(s) IV Bolus once  dextrose 5%. 1000 milliLiter(s) (50 mL/Hr) IV Continuous <Continuous>  dextrose 5%. 1000 milliLiter(s) (100 mL/Hr) IV Continuous <Continuous>  dextrose 50% Injectable 25 Gram(s) IV Push once  dextrose 50% Injectable 12.5 Gram(s) IV Push once  glucagon  Injectable 1 milliGRAM(s) IntraMuscular once  insulin lispro (ADMELOG) corrective regimen sliding scale   SubCutaneous three times a day before meals  insulin lispro (ADMELOG) corrective regimen sliding scale   SubCutaneous at bedtime  pantoprazole    Tablet 40 milliGRAM(s) Oral before breakfast  sodium chloride 0.9%. 1000 milliLiter(s) (200 mL/Hr) IV Continuous <Continuous>  sotalol. 80 milliGRAM(s) Oral every 12 hours    MEDICATIONS  (PRN):  ALPRAZolam 0.25 milliGRAM(s) Oral at bedtime PRN anxiety  dextrose Oral Gel 15 Gram(s) Oral once PRN Blood Glucose LESS THAN 70 milliGRAM(s)/deciliter    PHYSICAL EXAM:  T(C): 36.5 (07 Jun 2024 06:00), Max: 37.1 (06 Jun 2024 07:18)  T(F): 97.7 (07 Jun 2024 06:00), Max: 98.7 (06 Jun 2024 07:18)  HR: 80 (07 Jun 2024 05:00) (79 - 98)  BP: 92/74 (07 Jun 2024 05:00) (92/74 - 146/97)  BP(mean): 81 (07 Jun 2024 05:00) (79 - 119)  RR: 20 (07 Jun 2024 05:00) (18 - 29)  SpO2: 93% (07 Jun 2024 05:00) (92% - 97%)    Parameters below as of 07 Jun 2024 02:00  Patient On (Oxygen Delivery Method): room air    Constitutional: NAD, awake and alert  HEENT: PERR, EOMI, Normal Hearing, MMM  Neck: Soft and supple, No LAD, No JVD  Respiratory: Breath sounds are clear bilaterally, No wheezing, rales or rhonchi  Cardiovascular: S1 and S2, regular rate and rhythm, no Murmurs, gallops or rubs  Gastrointestinal: Bowel Sounds present, soft, nontender, nondistended, no guarding, no rebound  Extremities: No peripheral edema  Vascular: 2+ peripheral pulses  Neurological: A/O x 3, no focal deficits  Musculoskeletal: 5/5 strength b/l upper and lower extremities  Skin: No rashes    =======================================    INTERPRETATION OF TELEMETRY: SR    ========================================    LABS: All Labs Reviewed:                        14.5   13.33 )-----------( 233      ( 06 Jun 2024 05:29 )             42.3     06-07    140  |  111<H>  |  15  ----------------------------<  113<H>  4.1   |  26  |  0.68    Ca    8.7      07 Jun 2024 05:51  Phos  2.7     06-06  Mg     2.2     06-07    TPro  6.4  /  Alb  3.2<L>  /  TBili  0.9  /  DBili  x   /  AST  43<H>  /  ALT  98<H>  /  AlkPhos  54  06-06    PT/INR - ( 05 Jun 2024 11:54 )   PT: 12.4 sec;   INR: 1.10 ratio      PTT - ( 05 Jun 2024 11:54 )  PTT:31.3 sec  CARDIAC MARKERS ( 06 Jun 2024 05:29 )    x     / x     / 139 U/L / x     / x      CARDIAC MARKERS ( 05 Jun 2024 20:07 )  x     / x     / 183 U/L / x     / x        Troponin: 76.30 ng/L, Troponin: 117.02 ng/L, Troponin: 96.19 ng/L    CARDIAC TESTING:    < from: TTE Limited W or WO Ultrasound Enhancing Agent (06.05.24 @ 22:25) >   1. Left ventricular systolic function is normal with an ejection fraction of 55 % by Kennedy's method of disks.   2. The basal inferior wall appears hypokinetic.   3. Structurally normal pulmonic valve with normal leaflet excursion.   4. No significant valvular disease.    < end of copied text >      < from: TTE W or WO Ultrasound Enhancing Agent (05.21.24 @ 15:30) >  1. Left ventricular systolic function is normal with an ejection fraction of 55 % by Kennedy's method of disks.   2. Normal right ventricular cavity size and normal systolic function.   3. Mild mitral regurgitation.   4. Mild to moderate aortic regurgitation.   5. Mild to moderate left ventricular hypertrophy.   6. Thereis akinesis of the inferior wall but overall preserved LV function.    < from: Cardiac Catheterization (05.21.24 @ 10:51) >  Conclusions: There is significant triple vessel coronary artery disease. A successful intervention of the RCA was performed. RPDA appears a  and fills retrograde.   Recommendations: Aggressive risk factor modification with diet, exercise, and a goal LDL of less than 70.; Dual antiplatelet therapy should be continued for a minimum of 6 months

## 2024-06-09 NOTE — PROGRESS NOTE ADULT - SUBJECTIVE AND OBJECTIVE BOX
ELECTROPHYSIOLOGY   PROGRESS NOTE    Reason for follow up: Sustained VT, sotolol loading, ICD implantation  Overnight: No new events.   Update: NPO after midnight tonight for ICD implantation in am. Continue sotolol 80 mg Q12h QTc this am 489ms    Subjective: "  _____I'm good_________________"    General: No fatigue, no fevers/chills  Respiratory: No dyspnea, no cough, no wheeze  CV: No chest pain, no palpitations  Abd: No nausea  Neuro: No headache, no dizziness  	  Vitals:  T(C): 36.4 (06-09-24 @ 04:00), Max: 37 (06-08-24 @ 17:02)  HR: 70 (06-09-24 @ 05:50) (69 - 108)  BP: 107/82 (06-09-24 @ 05:50) (105/65 - 117/70)  RR: 22 (06-09-24 @ 05:50) (19 - 28)  SpO2: 94% (06-09-24 @ 05:50) (93% - 96%)  Wt(kg): --  I&O's Summary    08 Jun 2024 07:01  -  09 Jun 2024 07:00  --------------------------------------------------------  IN: 600 mL / OUT: 1500 mL / NET: -900 mL      Weight (kg): 66.8 (06-05 @ 11:51)      PHYSICAL EXAM:  Appearance: Comfortable. No acute distress  HEENT:  Head and neck: Atraumatic. Normocephalic.  Normal oral mucosa, PERRL, Neck is supple. No JVD, No carotid bruit.   Neurologic: A & O x 3, no focal deficits. EOMI.  Lymphatic: No cervical lymphadenopathy  Cardiovascular: Normal S1 S2, No murmur, rubs/gallops. No JVD, No edema  Respiratory: Lungs clear to auscultation  Gastrointestinal:  Soft, Non-tender, + BS  Lower Extremities: No edema  Psychiatry: Patient is calm. No agitation. Mood & affect appropriate  Skin: No rashes/ ecchymoses/cyanosis/ulcers visualized on the face, hands or feet.      CURRENT MEDICATIONS:  sotalol. 80 milliGRAM(s) Oral every 12 hours    ceFAZolin  Injectable.  atorvastatin  aspirin enteric coated  chlorhexidine 4% Liquid  clopidogrel Tablet   TRANSTHORACIC ECHOCARDIOGRAM REPORT  ________________________________________________________________________________                                      _______       Pt. Name:       ANTHONY GARCIA Study Date:    6/5/2024  MRN:            [id number]             YOB: 1970  Accession #:    [id number]            Age:           53 years  Account#:       [id number]         Gender:        M  Visit ID#  Heart Rate:     88 bpm               Height:        69.69 in (177.00 cm)  Rhythm:                              Weight:        146.00 lb (66.23 kg)  Blood Pressure: 124/90 mmHg          BSA/BMI:       1.82 m² / 21.14 kg/m²  ________________________________________________________________________________________  Referring Physician:    [id number] Martin Bliss  Interpreting Physician: Kassy Hair  Primary Sonographer:    Noelle Ann    CPT:                ECHO TTE W CON  LTD - .m;DEFINITY ECHO CONTRAST PER                      ML - .m  Indication(s):      Ventricular tachycardia, unspecified - I47.20  Procedure:          Limited transthoracic echocardiogram.  Ordering Location:    Admission Status:   Inpatient  Contrast Injection: Verbal consent was obtained for injection of Ultrasonic        Enhancing Agent following a discussion of risks and                      benefits.                      Endocardial visualization enhanced with 2 ml of Definity                      Ultrasound enhancing agent (Lot#:[id number] Exp.Date:04/31/2025).  UEA Reaction:       Patient had no adverse reaction after injection of                      Ultrasound Enhancing Agent.  Study Information:  Image quality for this study is fair.    _______________________________________________________________________________________     CONCLUSIONS:      1. Left ventricular systolic function is normal with an ejection fraction of 55 % by Kennedy's method of disks.   2. The basal inferior wall appears hypokinetic.   3. Structurally normal pulmonic valve with normal leaflet excursion.   4. No significant valvular disease.    ________________________________________________________________________________________  FINDINGS:     Left Ventricle:  After obtaining consent, Definity ultrasound enhancing agent was given for enhanced left ventricular opacification and improved delineation of the left ventricular endocardial borders. Left ventricular systolic function is normal with a calculated ejection fraction of 55 % by the Kennedy's biplane method of disks. Unable to assess left ventricular diastolic function due to insufficient data. The basal inferior wall appears hypokinetic.     Pulmonic Valve:  Structurally normal pulmonic valve with normal leaflet excursion.     Pericardium:  No pericardial effusion seen.  ____________________________________________________________________  QUANTITATIVE DATA:  Left Ventricle Measurements: (Indexed to BSA)    < from: Cardiac Catheterization (05.21.24 @ 10:51) >  Conclusions: There is significant triple vessel coronary artery disease. A successful intervention of the RCA was performed. RPDA appears a  and fills retrograde.   Recommendations: Aggressive risk factor modification with diet, exercise, and a goal LDL of less than 70.; Dual antiplatelet therapy should be continued for a minimum of 6 months   LABS:	 	                            16.7   9.68  )-----------( 276      ( 09 Jun 2024 05:26 )             48.3     06-09    139  |  109<H>  |  18  ----------------------------<  124<H>  4.0   |  26  |  0.65    Ca    9.0      09 Jun 2024 05:26  Phos  4.2     06-08  Mg     2.0     06-09        TELEMETRY: Reviewed  NSR 70s No overnight events  ECG:  Reviewed by me. 	    Ventricular Rate 73 BPM    Atrial Rate 73 BPM    P-R Interval 138 ms    QRS Duration 82 ms    Q-T Interval 444 ms    QTC Calculation(Bazett) 489 ms    P Axis 58 degrees    R Axis 56 degrees    T Axis 1 degrees    Diagnosis Line Normal sinus rhythm  Right atrial enlargement  Prolonged QT  Abnormal ECG  When compared with ECG of 08-JUN-2024 20:16,  Inverted T waves have replaced nonspecific T wave abnormality in Inferior leads  Confirmed by Shaylee Mishra ([id number]) on 6/9/2024 9:06:52 AM

## 2024-06-10 PROBLEM — I25.10 ATHEROSCLEROTIC HEART DISEASE OF NATIVE CORONARY ARTERY WITHOUT ANGINA PECTORIS: Chronic | Status: ACTIVE | Noted: 2024-06-05

## 2024-06-10 PROBLEM — I21.3 ST ELEVATION (STEMI) MYOCARDIAL INFARCTION OF UNSPECIFIED SITE: Chronic | Status: ACTIVE | Noted: 2024-06-05

## 2024-06-10 LAB
ADD ON TEST-SPECIMEN IN LAB: SIGNIFICANT CHANGE UP
ALBUMIN SERPL ELPH-MCNC: 3.2 G/DL — LOW (ref 3.3–5)
ALP SERPL-CCNC: 60 U/L — SIGNIFICANT CHANGE UP (ref 40–120)
ALT FLD-CCNC: 93 U/L — HIGH (ref 12–78)
ANION GAP SERPL CALC-SCNC: 5 MMOL/L — SIGNIFICANT CHANGE UP (ref 5–17)
AST SERPL-CCNC: 70 U/L — HIGH (ref 15–37)
BILIRUB DIRECT SERPL-MCNC: 0.1 MG/DL — SIGNIFICANT CHANGE UP (ref 0–0.3)
BILIRUB INDIRECT FLD-MCNC: 0.5 MG/DL — SIGNIFICANT CHANGE UP (ref 0.2–1)
BILIRUB SERPL-MCNC: 0.6 MG/DL — SIGNIFICANT CHANGE UP (ref 0.2–1.2)
BUN SERPL-MCNC: 20 MG/DL — SIGNIFICANT CHANGE UP (ref 7–23)
CALCIUM SERPL-MCNC: 9.6 MG/DL — SIGNIFICANT CHANGE UP (ref 8.5–10.1)
CHLORIDE SERPL-SCNC: 109 MMOL/L — HIGH (ref 96–108)
CO2 SERPL-SCNC: 26 MMOL/L — SIGNIFICANT CHANGE UP (ref 22–31)
CREAT SERPL-MCNC: 0.8 MG/DL — SIGNIFICANT CHANGE UP (ref 0.5–1.3)
EGFR: 107 ML/MIN/1.73M2 — SIGNIFICANT CHANGE UP
GLUCOSE SERPL-MCNC: 112 MG/DL — HIGH (ref 70–99)
HCT VFR BLD CALC: 49.3 % — SIGNIFICANT CHANGE UP (ref 39–50)
HGB BLD-MCNC: 17.2 G/DL — HIGH (ref 13–17)
MAGNESIUM SERPL-MCNC: 2.2 MG/DL — SIGNIFICANT CHANGE UP (ref 1.6–2.6)
MCHC RBC-ENTMCNC: 32.3 PG — SIGNIFICANT CHANGE UP (ref 27–34)
MCHC RBC-ENTMCNC: 34.9 GM/DL — SIGNIFICANT CHANGE UP (ref 32–36)
MCV RBC AUTO: 92.7 FL — SIGNIFICANT CHANGE UP (ref 80–100)
PLATELET # BLD AUTO: 276 K/UL — SIGNIFICANT CHANGE UP (ref 150–400)
POTASSIUM SERPL-MCNC: 3.9 MMOL/L — SIGNIFICANT CHANGE UP (ref 3.5–5.3)
POTASSIUM SERPL-SCNC: 3.9 MMOL/L — SIGNIFICANT CHANGE UP (ref 3.5–5.3)
PROT SERPL-MCNC: 6.7 GM/DL — SIGNIFICANT CHANGE UP (ref 6–8.3)
RBC # BLD: 5.32 M/UL — SIGNIFICANT CHANGE UP (ref 4.2–5.8)
RBC # FLD: 12.6 % — SIGNIFICANT CHANGE UP (ref 10.3–14.5)
SODIUM SERPL-SCNC: 140 MMOL/L — SIGNIFICANT CHANGE UP (ref 135–145)
WBC # BLD: 9.17 K/UL — SIGNIFICANT CHANGE UP (ref 3.8–10.5)
WBC # FLD AUTO: 9.17 K/UL — SIGNIFICANT CHANGE UP (ref 3.8–10.5)

## 2024-06-10 PROCEDURE — 99232 SBSQ HOSP IP/OBS MODERATE 35: CPT

## 2024-06-10 PROCEDURE — 71045 X-RAY EXAM CHEST 1 VIEW: CPT | Mod: 26

## 2024-06-10 PROCEDURE — 33249 INSJ/RPLCMT DEFIB W/LEAD(S): CPT

## 2024-06-10 PROCEDURE — 93010 ELECTROCARDIOGRAM REPORT: CPT

## 2024-06-10 RX ORDER — CEFAZOLIN SODIUM 1 G
1000 VIAL (EA) INJECTION EVERY 8 HOURS
Refills: 0 | Status: COMPLETED | OUTPATIENT
Start: 2024-06-10 | End: 2024-06-11

## 2024-06-10 RX ORDER — CEFAZOLIN SODIUM 1 G
1000 VIAL (EA) INJECTION EVERY 8 HOURS
Refills: 0 | Status: DISCONTINUED | OUTPATIENT
Start: 2024-06-10 | End: 2024-06-10

## 2024-06-10 RX ADMIN — Medication 2000 MILLIGRAM(S): at 13:50

## 2024-06-10 RX ADMIN — CHLORHEXIDINE GLUCONATE 1 APPLICATION(S): 213 SOLUTION TOPICAL at 11:44

## 2024-06-10 RX ADMIN — Medication 650 MILLIGRAM(S): at 22:17

## 2024-06-10 RX ADMIN — Medication 81 MILLIGRAM(S): at 09:25

## 2024-06-10 RX ADMIN — Medication 80 MILLIGRAM(S): at 06:35

## 2024-06-10 RX ADMIN — CLOPIDOGREL BISULFATE 75 MILLIGRAM(S): 75 TABLET, FILM COATED ORAL at 09:25

## 2024-06-10 RX ADMIN — Medication 1000 MILLIGRAM(S): at 23:53

## 2024-06-10 RX ADMIN — ATORVASTATIN CALCIUM 80 MILLIGRAM(S): 80 TABLET, FILM COATED ORAL at 22:16

## 2024-06-10 RX ADMIN — Medication 650 MILLIGRAM(S): at 23:17

## 2024-06-10 RX ADMIN — Medication 80 MILLIGRAM(S): at 17:52

## 2024-06-10 NOTE — PROGRESS NOTE ADULT - ASSESSMENT
54 y/o male with a PMHx of seasonal allergies, ventral hernia, 5/21 +STEMI s/p LHC S/P ADOLFO to RCA. He went to urgent care and was found to be in VT, EMS called, amiodarone given in route.  IV lidocaine given, 120 J synch biphasic cardioversion resulted in restoration of sinus rhythm by EP and admitted to CCU for further management.    #Monomorphic VT s/p CV  #Recent STEMI. s/p PCI to RCA  #Dyslipidemia  #Pre-Diabetic - a1c 6.2  #Transaminitis - monitor    Monitor on tele  6/6/24: LHC: revealed patent RCA stent,  of RPDA with collaterals which has no changes from prior.   S/p Sotalol loading, c/w Sotalol. QTC stable.  Continue aspirin, plavix, statin  Echocardiogram reviewed, LVEF 55%  Awaiting ICD placement today    Will follow

## 2024-06-10 NOTE — PROGRESS NOTE ADULT - SUBJECTIVE AND OBJECTIVE BOX
Cardiology Progress Note    CHIEF COMPLAINT: Patient is a 53y old  Male who presents with a chief complaint of     FROM H&P: 54 y/o male with a PMHx of seasonal allergies, ventral hernia presents to the ED BIBA from urgent care for Vtach. Pt was seen in ED on 5/21, EKG showing +STEMI and pt went to cath lab. Pt s/p LHC S/P ADOLFO to RCA on 81mg ASA and Plavix. Pt reports palpitations since 7am. Pt went to urgent care, EKG showing Vtach and pt brought to ED for evaluation. EMS administered Amiodarone 150mg. Denies CP, SOB, syncope. No EtOH use. No drug use. No other complaints at this time. Cardio: Dr. Dueñas.  IV lidocaine given, 120 J synch biphasic cardioversion resulted in restoration of sinus rhythm by EP.    6/5. Doing ok, a little tired.   No CP or SOB at the moment. SR    6/6. no overnight events    6/7. S/p cath yesterday > revealed patent RCA stent,  of RPDA with collaterals which has no changes from prior. Feeling well, no complaints, no events on tele, qtc stable so far.    6/8. No events last pm. No CP/SOB. Lying flat in bed. SR on tele. Sotalol loading now and awaiting ICD placement for monday.     6/9. No events last pm. No CP/SOB. SR on tele. For ICD placement Monday AM.    6/10. No Events. ICD today.      MEDICATIONS  (STANDING):  aspirin enteric coated 81 milliGRAM(s) Oral daily  atorvastatin 80 milliGRAM(s) Oral at bedtime  ceFAZolin  Injectable. 2000 milliGRAM(s) IV Push once  chlorhexidine 4% Liquid 1 Application(s) Topical daily  clopidogrel Tablet 75 milliGRAM(s) Oral daily  sotalol. 80 milliGRAM(s) Oral every 12 hours    MEDICATIONS  (PRN):  acetaminophen     Tablet .. 650 milliGRAM(s) Oral every 6 hours PRN Temp greater or equal to 38C (100.4F), Mild Pain (1 - 3)  aluminum hydroxide/magnesium hydroxide/simethicone Suspension 30 milliLiter(s) Oral every 4 hours PRN Dyspepsia  ondansetron Injectable 4 milliGRAM(s) IV Push every 8 hours PRN Nausea and/or Vomiting      PHYSICAL EXAM:  Vital Signs Last 24 Hrs  T(C): 36.7 (10 Chevy 2024 08:22), Max: 36.8 (09 Jun 2024 18:00)  T(F): 98 (10 Chevy 2024 08:22), Max: 98.3 (09 Jun 2024 18:00)  HR: 72 (10 Chevy 2024 08:22) (71 - 89)  BP: 109/79 (10 Chevy 2024 08:22) (105/78 - 120/86)  BP(mean): 96 (09 Jun 2024 18:00) (95 - 96)  RR: 18 (10 Chevy 2024 08:22) (17 - 18)  SpO2: 97% (10 Chevy 2024 08:22) (93% - 99%)    Parameters below as of 10 Chevy 2024 08:22  Patient On (Oxygen Delivery Method): room air      Constitutional: NAD, awake and alert  HEENT: PERR, EOMI, Normal Hearing, MMM  Neck: Soft and supple, No LAD, No JVD  Respiratory: Breath sounds are clear bilaterally, No wheezing, rales or rhonchi  Cardiovascular: S1 and S2, regular rate and rhythm, no Murmurs, gallops or rubs  Gastrointestinal: Bowel Sounds present, soft, nontender, nondistended, no guarding, no rebound  Extremities: No peripheral edema  Vascular: 2+ peripheral pulses  Neurological: A/O x 3, no focal deficits  Musculoskeletal: 5/5 strength b/l upper and lower extremities  Skin: No rashes    =======================================    INTERPRETATION OF TELEMETRY: SR    ========================================    LABS: All Labs Reviewed:                        17.2   9.17  )-----------( 276      ( 10 Chevy 2024 07:21 )             49.3     06-10    140  |  109<H>  |  20  ----------------------------<  112<H>  3.9   |  26  |  0.80    Ca    9.6      10 Chevy 2024 07:21  Mg     2.2     06-10    Troponin: 76.30 ng/L, Troponin: 117.02 ng/L, Troponin: 96.19 ng/L    CARDIAC TESTING:    < from: TTE Limited W or WO Ultrasound Enhancing Agent (06.05.24 @ 22:25) >   1. Left ventricular systolic function is normal with an ejection fraction of 55 % by Kennedy's method of disks.   2. The basal inferior wall appears hypokinetic.   3. Structurally normal pulmonic valve with normal leaflet excursion.   4. No significant valvular disease.    < end of copied text >      < from: TTE W or WO Ultrasound Enhancing Agent (05.21.24 @ 15:30) >  1. Left ventricular systolic function is normal with an ejection fraction of 55 % by Kennedy's method of disks.   2. Normal right ventricular cavity size and normal systolic function.   3. Mild mitral regurgitation.   4. Mild to moderate aortic regurgitation.   5. Mild to moderate left ventricular hypertrophy.   6. Thereis akinesis of the inferior wall but overall preserved LV function.    < from: Cardiac Catheterization (05.21.24 @ 10:51) >  Conclusions: There is significant triple vessel coronary artery disease. A successful intervention of the RCA was performed. RPDA appears a  and fills retrograde.   Recommendations: Aggressive risk factor modification with diet, exercise, and a goal LDL of less than 70.; Dual antiplatelet therapy should be continued for a minimum of 6 months

## 2024-06-10 NOTE — PACU DISCHARGE NOTE - COMMENTS
pt aaox4, pt denies any complaints, clean dry dressing intact over left acw, no s/sx of hematoma, swelling, or bleeding noted. NSR 70s on monitor, report given to Tatianna BISHOP on 3N at 1520, called telemonitor tech, pt is on monitor, awaiting for transporter.

## 2024-06-10 NOTE — PROGRESS NOTE ADULT - SUBJECTIVE AND OBJECTIVE BOX
CC: Sustained ventricular tachycardia (09 Jun 2024 10:01)    HPI:  54 yo male, recently admitted to  following inferior STEMI 5/21 s/p 1 ADOLFO to [facility] with pEF, who now presented with palpitations. Patient developed palpitations at rest today with associated symptoms of diaphoresis and shortness of breath. His son put his iWatch on him and found his heart rate to be 180 bpm. He presented to Urgent Care for evaluation and was found to be in sustained ventricular tachycardia, prompting transfer to  ED. Of note, patient reports intermittent episodes of palpitations, diaphoresis, and shortness of breath over the past 2-3 days with dyspnea on exertion; denies any episodes of chest pain or LE edema. Patient was given Amiodarone 150mg IVPB by EMS without improvement. In ED, EP was consulted, patient was given Lidocaine without improvement. EP was unsure if rhythm was SVT with aberrancy, therefore given a trial of Adenosine without resolution. Ultimately decision was made to proceed with DCCV 250J with successful conversion to NSR. Initial troponin negative.   (05 Jun 2024 18:44)    6/10: no complaints  AICD today      PHYSICAL EXAM:    Daily     Daily     Vital Signs Last 24 Hrs  T(C): 36.2 (10 Chevy 2024 13:02), Max: 36.8 (09 Jun 2024 18:00)  T(F): 97.1 (10 Chevy 2024 13:02), Max: 98.3 (09 Jun 2024 18:00)  HR: 65 (10 Chevy 2024 13:02) (65 - 89)  BP: 109/94 (10 Chevy 2024 13:02) (105/78 - 120/86)  BP(mean): 96 (09 Jun 2024 18:00) (96 - 96)  RR: 18 (10 Chevy 2024 13:02) (18 - 18)  SpO2: 97% (10 Chevy 2024 13:02) (93% - 99%)    Constitutional: Weak and ill appearing  HEENT: Atraumatic, MOSES,   Respiratory: Breath Sounds normal, no rhonchi/wheeze  Cardiovascular: N S1S2;   Gastrointestinal: Abdomen soft, non tender, Bowel Sounds present  Extremities: No edema, peripheral pulses present  Neurological: AAO x 3, no gross focal motor deficits  Skin: Non cellulitic, no rash, ulcers  Lymph Nodes: No lymphadenopathy noted  Back: No CVA tenderness   Musculoskeletal: non tender  Breasts: Deferred  Genitourinary: deferred  Rectal: Deferred    All Labs/EKG/Radiology/Meds reviewed by me                          17.2   9.17  )-----------( 276      ( 10 Chevy 2024 07:21 )             49.3       CBC Full  -  ( 10 Chevy 2024 07:21 )  WBC Count : 9.17 K/uL  RBC Count : 5.32 M/uL  Hemoglobin : 17.2 g/dL  Hematocrit : 49.3 %  Platelet Count - Automated : 276 K/uL  Mean Cell Volume : 92.7 fl  Mean Cell Hemoglobin : 32.3 pg  Mean Cell Hemoglobin Concentration : 34.9 gm/dL  Auto Neutrophil # : x  Auto Lymphocyte # : x  Auto Monocyte # : x  Auto Eosinophil # : x  Auto Basophil # : x  Auto Neutrophil % : x  Auto Lymphocyte % : x  Auto Monocyte % : x  Auto Eosinophil % : x  Auto Basophil % : x      06-10    140  |  109<H>  |  20  ----------------------------<  112<H>  3.9   |  26  |  0.80    Ca    9.6      10 Chevy 2024 07:21  Mg     2.2     06-10    TPro  6.7  /  Alb  3.2<L>  /  TBili  0.6  /  DBili  0.1  /  AST  70<H>  /  ALT  93<H>  /  AlkPhos  60  06-10      LIVER FUNCTIONS - ( 10 Chevy 2024 07:21 )  Alb: 3.2 g/dL / Pro: 6.7 gm/dL / ALK PHOS: 60 U/L / ALT: 93 U/L / AST: 70 U/L / GGT: x                       Urinalysis Basic - ( 10 Chevy 2024 07:21 )    Color: x / Appearance: x / SG: x / pH: x  Gluc: 112 mg/dL / Ketone: x  / Bili: x / Urobili: x   Blood: x / Protein: x / Nitrite: x   Leuk Esterase: x / RBC: x / WBC x   Sq Epi: x / Non Sq Epi: x / Bacteria: x            MEDICATIONS  (STANDING):  aspirin enteric coated 81 milliGRAM(s) Oral daily  atorvastatin 80 milliGRAM(s) Oral at bedtime  ceFAZolin  Injectable. 2000 milliGRAM(s) IV Push once  chlorhexidine 4% Liquid 1 Application(s) Topical daily  clopidogrel Tablet 75 milliGRAM(s) Oral daily  sotalol. 80 milliGRAM(s) Oral every 12 hours    MEDICATIONS  (PRN):  acetaminophen     Tablet .. 650 milliGRAM(s) Oral every 6 hours PRN Temp greater or equal to 38C (100.4F), Mild Pain (1 - 3)  aluminum hydroxide/magnesium hydroxide/simethicone Suspension 30 milliLiter(s) Oral every 4 hours PRN Dyspepsia  ondansetron Injectable 4 milliGRAM(s) IV Push every 8 hours PRN Nausea and/or Vomiting

## 2024-06-10 NOTE — PROGRESS NOTE ADULT - ASSESSMENT
Assessment and Plan:   	  54 y/o male with recent inferior STEMI, s/p PCI of RCA on 5/21/24, normal EF, mild to mod LVH, presented with c/o palpitations, SOB,     Pt admitted with :      # Monomorphic VT:  admit  loaded with sotalol  Subsequently underwent successful cardioversion with 120J by EP on 6/5   LHC 6/6 showed patent RCA stent   Sotalol loading initiated on 6/6, monitor QTC K4, Mg2  Continue aspirin, plavix, statin  Echocardiogram reviewed, LVEF 55%  Awaiting ICD 6/10    PreDM- Follow up with PCP     Transaminitis - Monitor ,  avoid hepato toxins.     D/W RN , Patient

## 2024-06-10 NOTE — PROCEDURE NOTE - NSICDXPROCEDURE_GEN_ALL_CORE_FT
PROCEDURES:  External cardioversion 05-Jun-2024 13:29:05  Austin Chaudhary  
PROCEDURES:  Insertion of total dual chamber implantable cardioverter-defibrillator (ICD) system 10-Chevy-2024 14:43:09  Zehra Elias

## 2024-06-10 NOTE — PRE-OP CHECKLIST - AS TEMP SITE
forehead Humira Counseling:  I discussed with the patient the risks of adalimumab including but not limited to myelosuppression, immunosuppression, autoimmune hepatitis, demyelinating diseases, lymphoma, and serious infections.  The patient understands that monitoring is required including a PPD at baseline and must alert us or the primary physician if symptoms of infection or other concerning signs are noted.

## 2024-06-11 ENCOUNTER — TRANSCRIPTION ENCOUNTER (OUTPATIENT)
Age: 54
End: 2024-06-11

## 2024-06-11 VITALS
TEMPERATURE: 98 F | SYSTOLIC BLOOD PRESSURE: 117 MMHG | DIASTOLIC BLOOD PRESSURE: 93 MMHG | OXYGEN SATURATION: 95 % | RESPIRATION RATE: 18 BRPM | HEART RATE: 83 BPM

## 2024-06-11 LAB
ANION GAP SERPL CALC-SCNC: 6 MMOL/L — SIGNIFICANT CHANGE UP (ref 5–17)
BASOPHILS # BLD AUTO: 0.05 K/UL — SIGNIFICANT CHANGE UP (ref 0–0.2)
BASOPHILS NFR BLD AUTO: 0.4 % — SIGNIFICANT CHANGE UP (ref 0–2)
BUN SERPL-MCNC: 23 MG/DL — SIGNIFICANT CHANGE UP (ref 7–23)
CALCIUM SERPL-MCNC: 8.9 MG/DL — SIGNIFICANT CHANGE UP (ref 8.5–10.1)
CHLORIDE SERPL-SCNC: 110 MMOL/L — HIGH (ref 96–108)
CO2 SERPL-SCNC: 24 MMOL/L — SIGNIFICANT CHANGE UP (ref 22–31)
CREAT SERPL-MCNC: 0.73 MG/DL — SIGNIFICANT CHANGE UP (ref 0.5–1.3)
EGFR: 110 ML/MIN/1.73M2 — SIGNIFICANT CHANGE UP
EOSINOPHIL # BLD AUTO: 0.19 K/UL — SIGNIFICANT CHANGE UP (ref 0–0.5)
EOSINOPHIL NFR BLD AUTO: 1.6 % — SIGNIFICANT CHANGE UP (ref 0–6)
GLUCOSE SERPL-MCNC: 122 MG/DL — HIGH (ref 70–99)
HCT VFR BLD CALC: 48.7 % — SIGNIFICANT CHANGE UP (ref 39–50)
HGB BLD-MCNC: 16.7 G/DL — SIGNIFICANT CHANGE UP (ref 13–17)
IMM GRANULOCYTES NFR BLD AUTO: 0.4 % — SIGNIFICANT CHANGE UP (ref 0–0.9)
LYMPHOCYTES # BLD AUTO: 1.93 K/UL — SIGNIFICANT CHANGE UP (ref 1–3.3)
LYMPHOCYTES # BLD AUTO: 16.5 % — SIGNIFICANT CHANGE UP (ref 13–44)
MCHC RBC-ENTMCNC: 31.7 PG — SIGNIFICANT CHANGE UP (ref 27–34)
MCHC RBC-ENTMCNC: 34.3 GM/DL — SIGNIFICANT CHANGE UP (ref 32–36)
MCV RBC AUTO: 92.4 FL — SIGNIFICANT CHANGE UP (ref 80–100)
MONOCYTES # BLD AUTO: 1.11 K/UL — HIGH (ref 0–0.9)
MONOCYTES NFR BLD AUTO: 9.5 % — SIGNIFICANT CHANGE UP (ref 2–14)
NEUTROPHILS # BLD AUTO: 8.36 K/UL — HIGH (ref 1.8–7.4)
NEUTROPHILS NFR BLD AUTO: 71.6 % — SIGNIFICANT CHANGE UP (ref 43–77)
PLATELET # BLD AUTO: 294 K/UL — SIGNIFICANT CHANGE UP (ref 150–400)
POTASSIUM SERPL-MCNC: 3.9 MMOL/L — SIGNIFICANT CHANGE UP (ref 3.5–5.3)
POTASSIUM SERPL-SCNC: 3.9 MMOL/L — SIGNIFICANT CHANGE UP (ref 3.5–5.3)
RBC # BLD: 5.27 M/UL — SIGNIFICANT CHANGE UP (ref 4.2–5.8)
RBC # FLD: 12.6 % — SIGNIFICANT CHANGE UP (ref 10.3–14.5)
SODIUM SERPL-SCNC: 140 MMOL/L — SIGNIFICANT CHANGE UP (ref 135–145)
WBC # BLD: 11.69 K/UL — HIGH (ref 3.8–10.5)
WBC # FLD AUTO: 11.69 K/UL — HIGH (ref 3.8–10.5)

## 2024-06-11 PROCEDURE — 99239 HOSP IP/OBS DSCHRG MGMT >30: CPT

## 2024-06-11 PROCEDURE — 93010 ELECTROCARDIOGRAM REPORT: CPT | Mod: 76

## 2024-06-11 RX ORDER — SOTALOL HCL 120 MG
1 TABLET ORAL
Qty: 60 | Refills: 0
Start: 2024-06-11

## 2024-06-11 RX ADMIN — CLOPIDOGREL BISULFATE 75 MILLIGRAM(S): 75 TABLET, FILM COATED ORAL at 09:03

## 2024-06-11 RX ADMIN — Medication 1000 MILLIGRAM(S): at 06:28

## 2024-06-11 RX ADMIN — Medication 80 MILLIGRAM(S): at 09:03

## 2024-06-11 RX ADMIN — Medication 81 MILLIGRAM(S): at 09:03

## 2024-06-11 NOTE — PROGRESS NOTE ADULT - PROVIDER SPECIALTY LIST ADULT
Cardiology
Electrophysiology
Cardiology
Cardiology
Critical Care
Critical Care
Hospitalist
Intervent Cardiology
Electrophysiology
Hospitalist
Hospitalist
Cardiology
Cardiology
Critical Care

## 2024-06-11 NOTE — PROGRESS NOTE ADULT - SUBJECTIVE AND OBJECTIVE BOX
53y Male who had SOB which began at 7am on 6/5/24.  He called his primary cardiology group who advised him to go to the ED.  He went to urgent care and was brought to  by ambulance for WCT. In the ambulance he was given Amiodarone 150 mgs.  Upon arrival to ED Dr. Chaudhary gave Adenosine for WCT at a rate of 160-170 BPM,  followed by Lidocaine 90 mgs. His heart rate decreased but remained in WCT.  He was then sedated and DC CV with 120J which successfully CV him to .  IN May 2024 he was admitted with a STEMII and had PCI here at .  He has been complaint with his medications.  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     PAST MEDICAL & SURGICAL HISTORY:  Seasonal allergies  Ventral hernia without obstruction or gangrene  ST elevation myocardial infarction (STEMI)  Coronary artery disease  Deviated nasal septum-h/o      EKG:  SR with QTc of 500 ms   TELE:  SR         FAMILY HISTORY:  No pertinent family history in first degree relatives        MEDICATIONS  (STANDING):  aspirin enteric coated 81 milliGRAM(s) Oral daily  atorvastatin 80 milliGRAM(s) Oral at bedtime  chlorhexidine 4% Liquid 1 Application(s) Topical daily  clopidogrel Tablet 75 milliGRAM(s) Oral daily  sotalol. 80 milliGRAM(s) Oral every 12 hours    MEDICATIONS  (PRN):  acetaminophen     Tablet .. 650 milliGRAM(s) Oral every 6 hours PRN Temp greater or equal to 38C (100.4F), Mild Pain (1 - 3)  aluminum hydroxide/magnesium hydroxide/simethicone Suspension 30 milliLiter(s) Oral every 4 hours PRN Dyspepsia  ondansetron Injectable 4 milliGRAM(s) IV Push every 8 hours PRN Nausea and/or Vomiting      Allergies    No Known Allergies    Intolerances        Vital Signs Last 24 Hrs  T(C): 36.9 (11 Jun 2024 08:25), Max: 37.4 (10 Chevy 2024 22:08)  T(F): 98.5 (11 Jun 2024 08:25), Max: 99.3 (10 Chevy 2024 22:08)  HR: 83 (11 Jun 2024 08:25) (65 - 89)  BP: 117/93 (11 Jun 2024 08:25) (109/69 - 134/85)  BP(mean): 95 (11 Jun 2024 05:20) (93 - 95)  RR: 18 (11 Jun 2024 08:25) (18 - 18)  SpO2: 95% (11 Jun 2024 08:25) (95% - 98%)    Parameters below as of 11 Jun 2024 08:25  Patient On (Oxygen Delivery Method): room air        PHYSICAL EXAMINATION:    GENERAL APPEARANCE:  Pt. is not currently dyspneic, in no distress. Pt. is alert, oriented, and pleasant.  HEENT:  Pupils are normal and react normally. No icterus. Mucous membranes well colored.  NECK:  Supple. No lymphadenopathy. Jugular venous pressure not elevated. Carotids equal.   HEART:   The cardiac impulse has a normal quality. There are no murmurs, rubs or gallops noted  CHEST:  Chest is clear to auscultation. Normal respiratory effort.  ABDOMEN:  Soft and nontender.   EXTREMITIES:  There is no edema.   SKIN:  No rash or significant lesions are noted.    LABS:                        16.7   11.69 )-----------( 294      ( 11 Jun 2024 05:20 )             48.7     06-11    140  |  110<H>  |  23  ----------------------------<  122<H>  3.9   |  24  |  0.73    Ca    8.9      11 Jun 2024 05:20  Mg     2.2     06-10    TPro  6.7  /  Alb  3.2<L>  /  TBili  0.6  /  DBili  0.1  /  AST  70<H>  /  ALT  93<H>  /  AlkPhos  60  06-10      Urinalysis Basic - ( 11 Jun 2024 05:20 )    Color: x / Appearance: x / SG: x / pH: x  Gluc: 122 mg/dL / Ketone: x  / Bili: x / Urobili: x   Blood: x / Protein: x / Nitrite: x   Leuk Esterase: x / RBC: x / WBC x   Sq Epi: x / Non Sq Epi: x / Bacteria: x            RADIOLOGY & ADDITIONAL TESTS:    No obvious pneumo

## 2024-06-11 NOTE — PROGRESS NOTE ADULT - ASSESSMENT
This is a 53 year old man s/p Inferior wall MI last month who presented with sustained monomorphic ventricular tachycardia. initial ECG demonstrated wide complex tachycardia with p waves visible in tail of QRS. Adenosine resulted in AV block and loss ofp waves without termination of tachycardia. lidocaine 90 mg resulted in slowing of VT from 168 bpm to 150 bpm without termination.  Cardioversion performed.   He began sotalol and now is now Post- OP Day #1 -had dual chamber ICD implanted     He was given appointment for 1 week EKG to be done in our office   Instruction sheet on sotalol given to the pt  Pt will f/u in EP Clinic  in 2 weeks or sooner with concerns or questions for wound check   Pt will carry PPM card and hook up the  remote transmitter  Pt will return to all pre-op medications  Pt will avoid lifting > than 10 lbs with left arm  Pt will not shower for 5 days or drive for 2 weeks  Copy of discharge instructions given  This patient's questions were answered and understands the importance of following discharge instructions     Pt will f/u in EP Clinic  in 2 weeks or sooner with concerns or questions  Pt will carry PPM card and hook up the  remote transmitter  Pt will return to all pre-op medications  Pt will avoid lifting > than 10 lbs with left arm  Pt will not shower for 5 days or drive for 2 weeks  Copy of discharge instructions given  This patient's questions were answered and understands the importance of following discharge instructions     Interrogation reveals   Device Manufactured by [employer]  Mode: DDD  Thresholds:  Atrial   4.  Volts @.5   ms  Ventriclar.4  Volts  @ .5  ms  Impedance:  Atrial  625    Ohms, Ventricular  472 Ohms   Sensing:  Atrial 12.3   mV  Ventricular  25mV

## 2024-06-11 NOTE — DISCHARGE NOTE PROVIDER - NSDCFUADDAPPT_GEN_ALL_CORE_FT
CARDIOLOGY FOLLOW UP APPOINTMENT: 6/14/24 @1PM at [facility] with SALVATORE Rdz

## 2024-06-11 NOTE — DISCHARGE NOTE PROVIDER - HOSPITAL COURSE
54 yo M with above PMHx including CAD/STEMI, s/p PCI of RCA on 5/21, LVEF 55%  Presented with symptomatic sustained VT, s/p DCCV.  s/p LHC- patent stent in RCA  - metoprolol d/c'd, started sotalol 80mg po BID  and now pt is preparing for discharge     Assessment and Plan:   	  54 y/o male with recent inferior STEMI, s/p PCI of RCA on 5/21/24, normal EF, mild to mod LVH, presented with c/o palpitations, SOB,     Pt admitted with :      # Monomorphic VT:  admit  loaded with sotalol  Subsequently underwent successful cardioversion with 120J by EP on 6/5   LHC 6/6 showed patent RCA stent   Sotalol loading initiated on 6/6, monitored QTC K4, Mg2  Continue aspirin, plavix, statin  Echocardiogram reviewed, LVEF 55%  s/p ICD 6/10    Pre DM- Follow up with PCP     Transaminitis - Monitor ,  avoid hepato toxins.     D/W RN , Patient      52 yo M with above PMHx including CAD/STEMI, s/p PCI of RCA on 5/21, LVEF 55%  Presented with symptomatic sustained VT, s/p DCCV.  s/p LHC- patent stent in RCA  - metoprolol d/c'd, started sotalol 80mg po BID  and now pt is preparing for discharge    d/c home     time spent 45 min

## 2024-06-11 NOTE — DISCHARGE NOTE PROVIDER - CARE PROVIDER_API CALL
Austin Chaudhary.  Cardiac Electrophysiology  [address] [place] [address]  Phone: [phone]  Fax: [phone]  Follow Up Time:

## 2024-06-11 NOTE — DISCHARGE NOTE PROVIDER - NSDCCPCAREPLAN_GEN_ALL_CORE_FT
PRINCIPAL DISCHARGE DIAGNOSIS  Diagnosis: VT (ventricular tachycardia)  Assessment and Plan of Treatment:      PRINCIPAL DISCHARGE DIAGNOSIS  Diagnosis: VT (ventricular tachycardia)  Assessment and Plan of Treatment:       SECONDARY DISCHARGE DIAGNOSES  Diagnosis: Hyperglycemia  Assessment and Plan of Treatment: pre diabetic  diet and exercise  f/u with your PCP

## 2024-06-11 NOTE — DISCHARGE NOTE PROVIDER - NSDCFUSCHEDAPPT_GEN_ALL_CORE_FT
[facility] Physician [address]  Scheduled Appointment: 06/25/2024     [facility] [address]  Scheduled Appointment: 06/18/2024    [facility] [address]  Scheduled Appointment: 06/25/2024

## 2024-06-11 NOTE — DISCHARGE NOTE PROVIDER - ATTENDING ATTESTATION STATEMENT
PACU/Transfer Note
I have personally seen and examined the patient. I have collaborated with and supervised the

## 2024-06-18 ENCOUNTER — APPOINTMENT (OUTPATIENT)
Dept: ELECTROPHYSIOLOGY | Facility: CLINIC | Age: 54
End: 2024-06-18
Payer: COMMERCIAL

## 2024-06-18 ENCOUNTER — APPOINTMENT (OUTPATIENT)
Dept: ELECTROPHYSIOLOGY | Facility: CLINIC | Age: 54
End: 2024-06-18

## 2024-06-18 ENCOUNTER — NON-APPOINTMENT (OUTPATIENT)
Age: 54
End: 2024-06-18

## 2024-06-18 VITALS
OXYGEN SATURATION: 90 % | BODY MASS INDEX: 28 KG/M2 | HEIGHT: 64 IN | DIASTOLIC BLOOD PRESSURE: 87 MMHG | SYSTOLIC BLOOD PRESSURE: 131 MMHG | WEIGHT: 164 LBS | HEART RATE: 80 BPM

## 2024-06-18 DIAGNOSIS — Z95.810 PRESENCE OF AUTOMATIC (IMPLANTABLE) CARDIAC DEFIBRILLATOR: ICD-10-CM

## 2024-06-18 DIAGNOSIS — I47.20 VENTRICULAR TACHYCARDIA, UNSPECIFIED: ICD-10-CM

## 2024-06-18 PROCEDURE — 93283 PRGRMG EVAL IMPLANTABLE DFB: CPT

## 2024-06-18 PROCEDURE — 93000 ELECTROCARDIOGRAM COMPLETE: CPT

## 2024-06-18 RX ORDER — ATORVASTATIN CALCIUM 40 MG/1
40 TABLET, FILM COATED ORAL DAILY
Refills: 0 | Status: ACTIVE | COMMUNITY

## 2024-06-18 RX ORDER — ASPIRIN 81 MG
81 TABLET, DELAYED RELEASE (ENTERIC COATED) ORAL DAILY
Refills: 0 | Status: ACTIVE | COMMUNITY

## 2024-06-18 RX ORDER — SOTALOL HYDROCHLORIDE TABLES AF 80 MG/1
80 TABLET ORAL DAILY
Refills: 0 | Status: ACTIVE | COMMUNITY

## 2024-06-18 RX ORDER — CLOPIDOGREL BISULFATE 75 MG/1
75 TABLET, FILM COATED ORAL DAILY
Refills: 0 | Status: ACTIVE | COMMUNITY

## 2024-06-19 DIAGNOSIS — Z95.5 PRESENCE OF CORONARY ANGIOPLASTY IMPLANT AND GRAFT: ICD-10-CM

## 2024-06-19 DIAGNOSIS — I21.19 ST ELEVATION (STEMI) MYOCARDIAL INFARCTION INVOLVING OTHER CORONARY ARTERY OF INFERIOR WALL: ICD-10-CM

## 2024-06-19 DIAGNOSIS — I47.29 OTHER VENTRICULAR TACHYCARDIA: ICD-10-CM

## 2024-06-19 DIAGNOSIS — I25.10 ATHEROSCLEROTIC HEART DISEASE OF NATIVE CORONARY ARTERY WITHOUT ANGINA PECTORIS: ICD-10-CM

## 2024-06-19 DIAGNOSIS — Z79.82 LONG TERM (CURRENT) USE OF ASPIRIN: ICD-10-CM

## 2024-06-19 DIAGNOSIS — E78.5 HYPERLIPIDEMIA, UNSPECIFIED: ICD-10-CM

## 2024-06-19 DIAGNOSIS — R73.03 PREDIABETES: ICD-10-CM

## 2024-06-19 DIAGNOSIS — Z79.02 LONG TERM (CURRENT) USE OF ANTITHROMBOTICS/ANTIPLATELETS: ICD-10-CM

## 2024-06-19 DIAGNOSIS — I49.3 VENTRICULAR PREMATURE DEPOLARIZATION: ICD-10-CM

## 2024-06-19 DIAGNOSIS — D72.829 ELEVATED WHITE BLOOD CELL COUNT, UNSPECIFIED: ICD-10-CM

## 2024-06-19 DIAGNOSIS — E86.0 DEHYDRATION: ICD-10-CM

## 2024-06-19 DIAGNOSIS — K43.9 VENTRAL HERNIA WITHOUT OBSTRUCTION OR GANGRENE: ICD-10-CM

## 2024-06-19 DIAGNOSIS — I50.32 CHRONIC DIASTOLIC (CONGESTIVE) HEART FAILURE: ICD-10-CM

## 2024-06-25 ENCOUNTER — APPOINTMENT (OUTPATIENT)
Dept: ELECTROPHYSIOLOGY | Facility: CLINIC | Age: 54
End: 2024-06-25

## 2024-09-23 NOTE — PROGRESS NOTE ADULT - REASON FOR ADMISSION
Sustained ventricular tachycardia
Sustained ventricular tachycardia
What Type Of Note Output Would You Prefer (Optional)?: Standard Output
Sustained ventricular tachycardia
Sustained ventricular tachycardia
What Is The Reason For Today's Visit?: Full Body Skin Examination
Sustained ventricular tachycardia

## 2025-05-20 NOTE — PATIENT PROFILE ADULT - NSPROMEDSADMININFO_GEN_A_NUR
Awake on bed, alert and oriented x 4. IV on right AC. Instructed to be on NPO at 12 midnight as ordered. Call light within reach. Will continue to monitor.   no concerns

## 2025-07-21 NOTE — PROGRESS NOTE ADULT - SUBJECTIVE AND OBJECTIVE BOX
ESTABLISHED PRIMARY CARE VISIT    27  Name: Delvin Dodge   : 1971   Age: 54 y.o.  Sex: female        Assessment & Plan:     Problem List Items Addressed This Visit       Essential hypertension    Chronic, now controlled  Did not take lisinopril         Attention deficit hyperactivity disorder (ADHD), predominantly inattentive type    Chronic, controlled  Continue Adderall 30 mg 2 times daily  Monitor heart rate, has been high normal on rechecks         Relevant Medications    amphetamine-dextroamphetamine (ADDERALL, 30MG,) 30 MG tablet (Start on 2025)    amphetamine-dextroamphetamine (ADDERALL, 30MG,) 30 MG tablet (Start on 2025)    amphetamine-dextroamphetamine (ADDERALL, 30MG,) 30 MG tablet (Start on 2025)    Class 2 obesity without serious comorbidity with body mass index (BMI) of 35.0 to 35.9 in adult    Chronic, improved  Continue working on healthy lifestyle changes  Failed metformin  Continue Zepbound, increase to 7.5 mg weekly         Relevant Medications    amphetamine-dextroamphetamine (ADDERALL, 30MG,) 30 MG tablet (Start on 2025)    tirzepatide-weight management (ZEPBOUND) 7.5 MG/0.5ML SOAJ subCUTAneous auto-injector pen    amphetamine-dextroamphetamine (ADDERALL, 30MG,) 30 MG tablet (Start on 2025)    amphetamine-dextroamphetamine (ADDERALL, 30MG,) 30 MG tablet (Start on 2025)    Type 2 diabetes mellitus without complication, without long-term current use of insulin (HCC) - Primary    Chronic, controlled with last A1c 6.2  Continue working on healthy lifestyle changes  Failed metformin  Continue Zepbound, increase to 7.5 mg weekly         Relevant Medications    tirzepatide-weight management (ZEPBOUND) 7.5 MG/0.5ML SOAJ subCUTAneous auto-injector pen     Other Visit Diagnoses         Screening for colorectal cancer        Relevant Orders    Cologuard (Fecal DNA Colorectal Cancer Screening)      Screening mammogram for breast cancer        Reschedule  CC: Sustained ventricular tachycardia (09 Jun 2024 10:01)    HPI:  54 yo male, recently admitted to  following inferior STEMI 5/21 s/p 1 ADOLFO to [facility] with pEF, who now presented with palpitations. Patient developed palpitations at rest today with associated symptoms of diaphoresis and shortness of breath. His son put his iWatch on him and found his heart rate to be 180 bpm. He presented to Urgent Care for evaluation and was found to be in sustained ventricular tachycardia, prompting transfer to  ED. Of note, patient reports intermittent episodes of palpitations, diaphoresis, and shortness of breath over the past 2-3 days with dyspnea on exertion; denies any episodes of chest pain or LE edema. Patient was given Amiodarone 150mg IVPB by EMS without improvement. In ED, EP was consulted, patient was given Lidocaine without improvement. EP was unsure if rhythm was SVT with aberrancy, therefore given a trial of Adenosine without resolution. Ultimately decision was made to proceed with DCCV 250J with successful conversion to NSR. Initial troponin negative.   (05 Jun 2024 18:44)    INTERVAL HPI/OVERNIGHT EVENTS:    Vital Signs Last 24 Hrs  T(C): 36.7 (09 Jun 2024 10:05), Max: 37 (08 Jun 2024 20:00)  T(F): 98.1 (09 Jun 2024 10:05), Max: 98.6 (08 Jun 2024 20:00)  HR: 71 (09 Jun 2024 12:00) (70 - 76)  BP: 113/86 (09 Jun 2024 12:00) (101/71 - 117/70)  BP(mean): 95 (09 Jun 2024 12:00) (78 - 95)  RR: 17 (09 Jun 2024 12:00) (17 - 28)  SpO2: 95% (09 Jun 2024 12:00) (92% - 96%)    Parameters below as of 09 Jun 2024 12:00  Patient On (Oxygen Delivery Method): room air      I&O's Detail    08 Jun 2024 07:01  -  09 Jun 2024 07:00  --------------------------------------------------------  IN:    Oral Fluid: 600 mL  Total IN: 600 mL    OUT:    Voided (mL): 1500 mL  Total OUT: 1500 mL    Total NET: -900 mL        REVIEW OF SYSTEMS:    CONSTITUTIONAL: No weakness, fevers or chills  EYES/ENT: No visual changes;  No vertigo or throat pain   NECK: No pain or stiffness  RESPIRATORY: No cough, wheezing, hemoptysis; No shortness of breath  CARDIOVASCULAR: No chest pain or palpitations  GASTROINTESTINAL: No abdominal or epigastric pain. No nausea, vomiting, or hematemesis; No diarrhea or constipation. No melena or hematochezia.  GENITOURINARY: No dysuria, frequency or hematuria  NEUROLOGICAL: No numbness or weakness  SKIN: No itching, burning, rashes, or lesions   All other review of systems is negative unless indicated above.      PHYSICAL EXAM:  General: in no acute distress  Eyes: PERRLA, EOMI; conjunctiva and sclera clear  Head: Normocephalic; atraumatic  ENMT: No nasal discharge; airway clear    Respiratory: No wheezes, rales or rhonchi  Cardiovascular: Regular rate and rhythm. S1 and S2 Normal; No murmurs, gallops or rubs  Gastrointestinal: Soft non-tender non-distended; Normal bowel sounds    Extremities: Normal range of motion, No clubbing, cyanosis or edema  Vascular: Peripheral pulses palpable 2+ bilaterally  Neurological: Alert and oriented x4  Skin: Warm and dry.     Musculoskeletal: Normal muscle tone, without deformities  Psychiatric: Cooperative and appropriate                            16.7   9.68  )-----------( 276      ( 09 Jun 2024 05:26 )             48.3     09 Jun 2024 05:26    139    |  109    |  18     ----------------------------<  124    4.0     |  26     |  0.65     Ca    9.0        09 Jun 2024 05:26  Phos  4.2       08 Jun 2024 05:36  Mg     2.0       09 Jun 2024 05:26        CAPILLARY BLOOD GLUCOSE          Urinalysis Basic - ( 09 Jun 2024 05:26 )    Color: x / Appearance: x / SG: x / pH: x  Gluc: 124 mg/dL / Ketone: x  / Bili: x / Urobili: x   Blood: x / Protein: x / Nitrite: x   Leuk Esterase: x / RBC: x / WBC x   Sq Epi: x / Non Sq Epi: x / Bacteria: x        MEDICATIONS  (STANDING):  aspirin enteric coated 81 milliGRAM(s) Oral daily  atorvastatin 80 milliGRAM(s) Oral at bedtime  ceFAZolin  Injectable. 2000 milliGRAM(s) IV Push once  chlorhexidine 4% Liquid 1 Application(s) Topical daily  clopidogrel Tablet 75 milliGRAM(s) Oral daily  sotalol. 80 milliGRAM(s) Oral every 12 hours    MEDICATIONS  (PRN):  acetaminophen     Tablet .. 650 milliGRAM(s) Oral every 6 hours PRN Temp greater or equal to 38C (100.4F), Mild Pain (1 - 3)  aluminum hydroxide/magnesium hydroxide/simethicone Suspension 30 milliLiter(s) Oral every 4 hours PRN Dyspepsia  ondansetron Injectable 4 milliGRAM(s) IV Push every 8 hours PRN Nausea and/or Vomiting      RADIOLOGY & ADDITIONAL TESTS: